# Patient Record
Sex: FEMALE | Race: WHITE | NOT HISPANIC OR LATINO | URBAN - METROPOLITAN AREA
[De-identification: names, ages, dates, MRNs, and addresses within clinical notes are randomized per-mention and may not be internally consistent; named-entity substitution may affect disease eponyms.]

---

## 2019-01-01 ENCOUNTER — INPATIENT (INPATIENT)
Facility: HOSPITAL | Age: 74
LOS: 3 days | Discharge: HOME | DRG: 841 | End: 2019-10-28
Attending: INTERNAL MEDICINE | Admitting: INTERNAL MEDICINE
Payer: MEDICARE

## 2019-01-01 ENCOUNTER — INPATIENT (INPATIENT)
Facility: HOSPITAL | Age: 74
LOS: 3 days | Discharge: HOME HEALTH | DRG: 811 | End: 2019-05-16
Attending: FAMILY MEDICINE | Admitting: FAMILY MEDICINE
Payer: MEDICAID

## 2019-01-01 VITALS
HEART RATE: 64 BPM | RESPIRATION RATE: 18 BRPM | OXYGEN SATURATION: 93 % | SYSTOLIC BLOOD PRESSURE: 126 MMHG | DIASTOLIC BLOOD PRESSURE: 68 MMHG | TEMPERATURE: 98 F

## 2019-01-01 VITALS — RESPIRATION RATE: 24 BRPM | OXYGEN SATURATION: 92 % | HEART RATE: 94 BPM | WEIGHT: 250 LBS | TEMPERATURE: 98 F

## 2019-01-01 VITALS
DIASTOLIC BLOOD PRESSURE: 83 MMHG | TEMPERATURE: 98 F | HEIGHT: 62.2 IN | WEIGHT: 259.93 LBS | RESPIRATION RATE: 17 BRPM | SYSTOLIC BLOOD PRESSURE: 103 MMHG | HEART RATE: 70 BPM | OXYGEN SATURATION: 96 %

## 2019-01-01 VITALS
DIASTOLIC BLOOD PRESSURE: 70 MMHG | TEMPERATURE: 98 F | OXYGEN SATURATION: 100 % | HEART RATE: 95 BPM | SYSTOLIC BLOOD PRESSURE: 115 MMHG | RESPIRATION RATE: 18 BRPM

## 2019-01-01 DIAGNOSIS — N39.0 URINARY TRACT INFECTION, SITE NOT SPECIFIED: ICD-10-CM

## 2019-01-01 DIAGNOSIS — D64.9 ANEMIA, UNSPECIFIED: ICD-10-CM

## 2019-01-01 DIAGNOSIS — E78.5 HYPERLIPIDEMIA, UNSPECIFIED: ICD-10-CM

## 2019-01-01 DIAGNOSIS — N17.9 ACUTE KIDNEY FAILURE, UNSPECIFIED: ICD-10-CM

## 2019-01-01 DIAGNOSIS — R07.9 CHEST PAIN, UNSPECIFIED: ICD-10-CM

## 2019-01-01 DIAGNOSIS — R79.89 OTHER SPECIFIED ABNORMAL FINDINGS OF BLOOD CHEMISTRY: ICD-10-CM

## 2019-01-01 DIAGNOSIS — I25.10 ATHEROSCLEROTIC HEART DISEASE OF NATIVE CORONARY ARTERY WITHOUT ANGINA PECTORIS: ICD-10-CM

## 2019-01-01 DIAGNOSIS — Z29.9 ENCOUNTER FOR PROPHYLACTIC MEASURES, UNSPECIFIED: ICD-10-CM

## 2019-01-01 DIAGNOSIS — E88.09 OTHER DISORDERS OF PLASMA-PROTEIN METABOLISM, NOT ELSEWHERE CLASSIFIED: ICD-10-CM

## 2019-01-01 DIAGNOSIS — I10 ESSENTIAL (PRIMARY) HYPERTENSION: ICD-10-CM

## 2019-01-01 LAB
% ALBUMIN: 54.8 % — SIGNIFICANT CHANGE UP
% ALBUMIN: 56.1 % — SIGNIFICANT CHANGE UP
% ALPHA 1: 4.7 % — SIGNIFICANT CHANGE UP
% ALPHA 1: 4.9 % — SIGNIFICANT CHANGE UP
% ALPHA 2: 9.1 % — SIGNIFICANT CHANGE UP
% ALPHA 2: 9.1 % — SIGNIFICANT CHANGE UP
% BETA: 24.2 % — SIGNIFICANT CHANGE UP
% BETA: 24.4 % — SIGNIFICANT CHANGE UP
% GAMMA: 5.7 % — SIGNIFICANT CHANGE UP
% GAMMA: 7 % — SIGNIFICANT CHANGE UP
% M SPIKE: 13.7 % — SIGNIFICANT CHANGE UP
% M SPIKE: 14 % — SIGNIFICANT CHANGE UP
-  AMIKACIN: SIGNIFICANT CHANGE UP
-  AMPICILLIN/SULBACTAM: SIGNIFICANT CHANGE UP
-  AMPICILLIN: SIGNIFICANT CHANGE UP
-  AZTREONAM: SIGNIFICANT CHANGE UP
-  CEFAZOLIN: SIGNIFICANT CHANGE UP
-  CEFEPIME: SIGNIFICANT CHANGE UP
-  CEFOXITIN: SIGNIFICANT CHANGE UP
-  CEFTRIAXONE: SIGNIFICANT CHANGE UP
-  CIPROFLOXACIN: SIGNIFICANT CHANGE UP
-  ERTAPENEM: SIGNIFICANT CHANGE UP
-  GENTAMICIN: SIGNIFICANT CHANGE UP
-  IMIPENEM: SIGNIFICANT CHANGE UP
-  LEVOFLOXACIN: SIGNIFICANT CHANGE UP
-  MEROPENEM: SIGNIFICANT CHANGE UP
-  NITROFURANTOIN: SIGNIFICANT CHANGE UP
-  PIPERACILLIN/TAZOBACTAM: SIGNIFICANT CHANGE UP
-  TIGECYCLINE: SIGNIFICANT CHANGE UP
-  TOBRAMYCIN: SIGNIFICANT CHANGE UP
-  TRIMETHOPRIM/SULFAMETHOXAZOLE: SIGNIFICANT CHANGE UP
24R-OH-CALCIDIOL SERPL-MCNC: 23.7 NG/ML — LOW (ref 30–80)
24R-OH-CALCIDIOL SERPL-MCNC: 23.7 NG/ML — LOW (ref 30–80)
24R-OH-CALCIDIOL SERPL-MCNC: 25.1 NG/ML — LOW (ref 30–80)
ABO RH CONFIRMATION: SIGNIFICANT CHANGE UP
ALBUMIN SERPL ELPH-MCNC: 2.9 G/DL — LOW (ref 3.5–5)
ALBUMIN SERPL ELPH-MCNC: 3.5 G/DL — LOW (ref 3.6–5.5)
ALBUMIN SERPL ELPH-MCNC: 3.6 G/DL — SIGNIFICANT CHANGE UP (ref 3.5–5)
ALBUMIN SERPL ELPH-MCNC: 3.6 G/DL — SIGNIFICANT CHANGE UP (ref 3.6–5.5)
ALBUMIN/GLOB SERPL ELPH: 1.2 RATIO — SIGNIFICANT CHANGE UP
ALBUMIN/GLOB SERPL ELPH: 1.3 RATIO — SIGNIFICANT CHANGE UP
ALP SERPL-CCNC: 62 U/L — SIGNIFICANT CHANGE UP (ref 40–120)
ALP SERPL-CCNC: 64 U/L — SIGNIFICANT CHANGE UP (ref 40–120)
ALPHA1 GLOB SERPL ELPH-MCNC: 0.3 G/DL — SIGNIFICANT CHANGE UP (ref 0.1–0.4)
ALPHA1 GLOB SERPL ELPH-MCNC: 0.3 G/DL — SIGNIFICANT CHANGE UP (ref 0.1–0.4)
ALPHA2 GLOB SERPL ELPH-MCNC: 0.6 G/DL — SIGNIFICANT CHANGE UP (ref 0.5–1)
ALPHA2 GLOB SERPL ELPH-MCNC: 0.6 G/DL — SIGNIFICANT CHANGE UP (ref 0.5–1)
ALT FLD-CCNC: 16 U/L DA — SIGNIFICANT CHANGE UP (ref 10–60)
ALT FLD-CCNC: 21 U/L DA — SIGNIFICANT CHANGE UP (ref 10–60)
ANION GAP SERPL CALC-SCNC: 5 MMOL/L — SIGNIFICANT CHANGE UP (ref 5–17)
ANION GAP SERPL CALC-SCNC: 6 MMOL/L — SIGNIFICANT CHANGE UP (ref 5–17)
ANION GAP SERPL CALC-SCNC: 7 MMOL/L — SIGNIFICANT CHANGE UP (ref 5–17)
APPEARANCE UR: ABNORMAL
APPEARANCE UR: CLEAR — SIGNIFICANT CHANGE UP
APTT BLD: 31.4 SEC — SIGNIFICANT CHANGE UP (ref 27.5–36.3)
AST SERPL-CCNC: 13 U/L — SIGNIFICANT CHANGE UP (ref 10–40)
AST SERPL-CCNC: 13 U/L — SIGNIFICANT CHANGE UP (ref 10–40)
B-GLOBULIN SERPL ELPH-MCNC: 1.5 G/DL — HIGH (ref 0.5–1)
B-GLOBULIN SERPL ELPH-MCNC: 1.6 G/DL — HIGH (ref 0.5–1)
BACTERIA # UR AUTO: ABNORMAL /HPF
BASE EXCESS BLDV CALC-SCNC: -2.5 MMOL/L — LOW (ref -2–2)
BASOPHILS # BLD AUTO: 0.02 K/UL — SIGNIFICANT CHANGE UP (ref 0–0.2)
BASOPHILS # BLD AUTO: 0.03 K/UL — SIGNIFICANT CHANGE UP (ref 0–0.2)
BASOPHILS # BLD AUTO: 0.04 K/UL — SIGNIFICANT CHANGE UP (ref 0–0.2)
BASOPHILS # BLD AUTO: 0.05 K/UL — SIGNIFICANT CHANGE UP (ref 0–0.2)
BASOPHILS NFR BLD AUTO: 0.2 % — SIGNIFICANT CHANGE UP (ref 0–2)
BASOPHILS NFR BLD AUTO: 0.4 % — SIGNIFICANT CHANGE UP (ref 0–2)
BASOPHILS NFR BLD AUTO: 0.4 % — SIGNIFICANT CHANGE UP (ref 0–2)
BASOPHILS NFR BLD AUTO: 0.5 % — SIGNIFICANT CHANGE UP (ref 0–2)
BASOPHILS NFR BLD AUTO: 0.6 % — SIGNIFICANT CHANGE UP (ref 0–2)
BASOPHILS NFR BLD AUTO: 0.8 % — SIGNIFICANT CHANGE UP (ref 0–2)
BASOPHILS NFR BLD AUTO: 0.8 % — SIGNIFICANT CHANGE UP (ref 0–2)
BILIRUB SERPL-MCNC: 0.3 MG/DL — SIGNIFICANT CHANGE UP (ref 0.2–1.2)
BILIRUB SERPL-MCNC: 0.4 MG/DL — SIGNIFICANT CHANGE UP (ref 0.2–1.2)
BILIRUB UR-MCNC: NEGATIVE — SIGNIFICANT CHANGE UP
BILIRUB UR-MCNC: NEGATIVE — SIGNIFICANT CHANGE UP
BLD GP AB SCN SERPL QL: SIGNIFICANT CHANGE UP
BUN SERPL-MCNC: 13 MG/DL — SIGNIFICANT CHANGE UP (ref 7–18)
BUN SERPL-MCNC: 16 MG/DL — SIGNIFICANT CHANGE UP (ref 7–18)
BUN SERPL-MCNC: 23 MG/DL — HIGH (ref 7–18)
BUN SERPL-MCNC: 25 MG/DL — HIGH (ref 7–18)
BUN SERPL-MCNC: 27 MG/DL — HIGH (ref 7–18)
BUN SERPL-MCNC: 28 MG/DL — HIGH (ref 7–18)
BUN SERPL-MCNC: 28 MG/DL — HIGH (ref 7–18)
BUN SERPL-MCNC: 33 MG/DL — HIGH (ref 7–18)
BUN SERPL-MCNC: 35 MG/DL — HIGH (ref 7–18)
CALCIUM SERPL-MCNC: 7.8 MG/DL — LOW (ref 8.4–10.5)
CALCIUM SERPL-MCNC: 8 MG/DL — LOW (ref 8.4–10.5)
CALCIUM SERPL-MCNC: 8.4 MG/DL — SIGNIFICANT CHANGE UP (ref 8.4–10.5)
CALCIUM SERPL-MCNC: 8.6 MG/DL — SIGNIFICANT CHANGE UP (ref 8.4–10.5)
CALCIUM SERPL-MCNC: 8.8 MG/DL — SIGNIFICANT CHANGE UP (ref 8.4–10.5)
CALCIUM SERPL-MCNC: 9 MG/DL — SIGNIFICANT CHANGE UP (ref 8.4–10.5)
CALCIUM SERPL-MCNC: 9.1 MG/DL — SIGNIFICANT CHANGE UP (ref 8.4–10.5)
CALCIUM SERPL-MCNC: 9.1 MG/DL — SIGNIFICANT CHANGE UP (ref 8.4–10.5)
CALCIUM SERPL-MCNC: 9.3 MG/DL — SIGNIFICANT CHANGE UP (ref 8.4–10.5)
CANCER AG19-9 SERPL-ACNC: 9 U/ML — SIGNIFICANT CHANGE UP
CEA SERPL-MCNC: 2.4 NG/ML — SIGNIFICANT CHANGE UP (ref 0–3.8)
CEA SERPL-MCNC: 2.6 NG/ML — SIGNIFICANT CHANGE UP (ref 0–3.8)
CHLORIDE SERPL-SCNC: 110 MMOL/L — HIGH (ref 96–108)
CHLORIDE SERPL-SCNC: 111 MMOL/L — HIGH (ref 96–108)
CHLORIDE SERPL-SCNC: 111 MMOL/L — HIGH (ref 96–108)
CHLORIDE SERPL-SCNC: 112 MMOL/L — HIGH (ref 96–108)
CHLORIDE SERPL-SCNC: 112 MMOL/L — HIGH (ref 96–108)
CHLORIDE SERPL-SCNC: 113 MMOL/L — HIGH (ref 96–108)
CHLORIDE SERPL-SCNC: 113 MMOL/L — HIGH (ref 96–108)
CHLORIDE SERPL-SCNC: 114 MMOL/L — HIGH (ref 96–108)
CHLORIDE SERPL-SCNC: 115 MMOL/L — HIGH (ref 96–108)
CHLORIDE UR-SCNC: 113 MMOL/L — SIGNIFICANT CHANGE UP (ref 55–125)
CHOLEST SERPL-MCNC: 103 MG/DL — SIGNIFICANT CHANGE UP (ref 10–199)
CHOLEST SERPL-MCNC: 180 MG/DL — SIGNIFICANT CHANGE UP (ref 10–199)
CO2 SERPL-SCNC: 23 MMOL/L — SIGNIFICANT CHANGE UP (ref 22–31)
CO2 SERPL-SCNC: 24 MMOL/L — SIGNIFICANT CHANGE UP (ref 22–31)
CO2 SERPL-SCNC: 25 MMOL/L — SIGNIFICANT CHANGE UP (ref 22–31)
CO2 SERPL-SCNC: 25 MMOL/L — SIGNIFICANT CHANGE UP (ref 22–31)
CO2 SERPL-SCNC: 26 MMOL/L — SIGNIFICANT CHANGE UP (ref 22–31)
COLOR SPEC: YELLOW — SIGNIFICANT CHANGE UP
COLOR SPEC: YELLOW — SIGNIFICANT CHANGE UP
COMMENT - URINE: SIGNIFICANT CHANGE UP
CREAT ?TM UR-MCNC: 47 MG/DL — SIGNIFICANT CHANGE UP
CREAT SERPL-MCNC: 0.88 MG/DL — SIGNIFICANT CHANGE UP (ref 0.5–1.3)
CREAT SERPL-MCNC: 1.03 MG/DL — SIGNIFICANT CHANGE UP (ref 0.5–1.3)
CREAT SERPL-MCNC: 1.04 MG/DL — SIGNIFICANT CHANGE UP (ref 0.5–1.3)
CREAT SERPL-MCNC: 1.07 MG/DL — SIGNIFICANT CHANGE UP (ref 0.5–1.3)
CREAT SERPL-MCNC: 1.07 MG/DL — SIGNIFICANT CHANGE UP (ref 0.5–1.3)
CREAT SERPL-MCNC: 1.13 MG/DL — SIGNIFICANT CHANGE UP (ref 0.5–1.3)
CREAT SERPL-MCNC: 1.15 MG/DL — SIGNIFICANT CHANGE UP (ref 0.5–1.3)
CREAT SERPL-MCNC: 1.25 MG/DL — SIGNIFICANT CHANGE UP (ref 0.5–1.3)
CREAT SERPL-MCNC: 1.61 MG/DL — HIGH (ref 0.5–1.3)
CULTURE RESULTS: SIGNIFICANT CHANGE UP
CULTURE RESULTS: SIGNIFICANT CHANGE UP
DIFF PNL FLD: ABNORMAL
DIFF PNL FLD: ABNORMAL
DIR ANTIGLOB POLYSPECIFIC INTERPRETATION: SIGNIFICANT CHANGE UP
EOSINOPHIL # BLD AUTO: 0.01 K/UL — SIGNIFICANT CHANGE UP (ref 0–0.5)
EOSINOPHIL # BLD AUTO: 0.02 K/UL — SIGNIFICANT CHANGE UP (ref 0–0.5)
EOSINOPHIL # BLD AUTO: 0.03 K/UL — SIGNIFICANT CHANGE UP (ref 0–0.5)
EOSINOPHIL # BLD AUTO: 0.04 K/UL — SIGNIFICANT CHANGE UP (ref 0–0.5)
EOSINOPHIL # BLD AUTO: 0.04 K/UL — SIGNIFICANT CHANGE UP (ref 0–0.5)
EOSINOPHIL # BLD AUTO: 0.05 K/UL — SIGNIFICANT CHANGE UP (ref 0–0.5)
EOSINOPHIL # BLD AUTO: 0.07 K/UL — SIGNIFICANT CHANGE UP (ref 0–0.5)
EOSINOPHIL NFR BLD AUTO: 0.1 % — SIGNIFICANT CHANGE UP (ref 0–6)
EOSINOPHIL NFR BLD AUTO: 0.2 % — SIGNIFICANT CHANGE UP (ref 0–6)
EOSINOPHIL NFR BLD AUTO: 0.3 % — SIGNIFICANT CHANGE UP (ref 0–6)
EOSINOPHIL NFR BLD AUTO: 0.3 % — SIGNIFICANT CHANGE UP (ref 0–6)
EOSINOPHIL NFR BLD AUTO: 0.4 % — SIGNIFICANT CHANGE UP (ref 0–6)
EOSINOPHIL NFR BLD AUTO: 0.5 % — SIGNIFICANT CHANGE UP (ref 0–6)
EOSINOPHIL NFR BLD AUTO: 0.6 % — SIGNIFICANT CHANGE UP (ref 0–6)
EOSINOPHIL NFR BLD AUTO: 0.6 % — SIGNIFICANT CHANGE UP (ref 0–6)
EOSINOPHIL NFR BLD AUTO: 0.8 % — SIGNIFICANT CHANGE UP (ref 0–6)
EOSINOPHIL NFR BLD AUTO: 1.4 % — SIGNIFICANT CHANGE UP (ref 0–6)
EPI CELLS # UR: SIGNIFICANT CHANGE UP /HPF
ERYTHROCYTE [SEDIMENTATION RATE] IN BLOOD: 30 MM/HR — HIGH (ref 0–20)
FERRITIN SERPL-MCNC: 119 NG/ML — SIGNIFICANT CHANGE UP (ref 15–150)
FERRITIN SERPL-MCNC: 153 NG/ML — HIGH (ref 15–150)
FERRITIN SERPL-MCNC: 172 NG/ML — HIGH (ref 15–150)
FOLATE SERPL-MCNC: >20 NG/ML — SIGNIFICANT CHANGE UP
GAMMA GLOBULIN: 0.4 G/DL — LOW (ref 0.6–1.6)
GAMMA GLOBULIN: 0.4 G/DL — LOW (ref 0.6–1.6)
GLUCOSE SERPL-MCNC: 100 MG/DL — HIGH (ref 70–99)
GLUCOSE SERPL-MCNC: 101 MG/DL — HIGH (ref 70–99)
GLUCOSE SERPL-MCNC: 103 MG/DL — HIGH (ref 70–99)
GLUCOSE SERPL-MCNC: 105 MG/DL — HIGH (ref 70–99)
GLUCOSE SERPL-MCNC: 142 MG/DL — HIGH (ref 70–99)
GLUCOSE SERPL-MCNC: 94 MG/DL — SIGNIFICANT CHANGE UP (ref 70–99)
GLUCOSE SERPL-MCNC: 94 MG/DL — SIGNIFICANT CHANGE UP (ref 70–99)
GLUCOSE SERPL-MCNC: 97 MG/DL — SIGNIFICANT CHANGE UP (ref 70–99)
GLUCOSE SERPL-MCNC: 97 MG/DL — SIGNIFICANT CHANGE UP (ref 70–99)
GLUCOSE UR QL: NEGATIVE — SIGNIFICANT CHANGE UP
GLUCOSE UR QL: NEGATIVE — SIGNIFICANT CHANGE UP
HAPTOGLOB SERPL-MCNC: 56 MG/DL — SIGNIFICANT CHANGE UP (ref 34–200)
HBA1C BLD-MCNC: 5.9 % — HIGH (ref 4–5.6)
HBA1C BLD-MCNC: 6.2 % — HIGH (ref 4–5.6)
HBV SURFACE AG SER-ACNC: SIGNIFICANT CHANGE UP
HCO3 BLDV-SCNC: 24 MMOL/L — SIGNIFICANT CHANGE UP (ref 21–29)
HCT VFR BLD CALC: 24.4 % — LOW (ref 34.5–45)
HCT VFR BLD CALC: 25 % — LOW (ref 34.5–45)
HCT VFR BLD CALC: 26.8 % — LOW (ref 34.5–45)
HCT VFR BLD CALC: 27.4 % — LOW (ref 34.5–45)
HCT VFR BLD CALC: 27.5 % — LOW (ref 34.5–45)
HCT VFR BLD CALC: 27.5 % — LOW (ref 34.5–45)
HCT VFR BLD CALC: 28 % — LOW (ref 34.5–45)
HCT VFR BLD CALC: 28.3 % — LOW (ref 34.5–45)
HCT VFR BLD CALC: 29 % — LOW (ref 34.5–45)
HCT VFR BLD CALC: 29.2 % — LOW (ref 34.5–45)
HCT VFR BLD CALC: 29.4 % — LOW (ref 34.5–45)
HCV AB S/CO SERPL IA: 0.1 S/CO — SIGNIFICANT CHANGE UP (ref 0–0.99)
HCV AB SERPL-IMP: SIGNIFICANT CHANGE UP
HDLC SERPL-MCNC: 34 MG/DL — LOW
HDLC SERPL-MCNC: 37 MG/DL — LOW
HGB BLD-MCNC: 7.5 G/DL — LOW (ref 11.5–15.5)
HGB BLD-MCNC: 7.8 G/DL — LOW (ref 11.5–15.5)
HGB BLD-MCNC: 8.1 G/DL — LOW (ref 11.5–15.5)
HGB BLD-MCNC: 8.4 G/DL — LOW (ref 11.5–15.5)
HGB BLD-MCNC: 8.5 G/DL — LOW (ref 11.5–15.5)
HGB BLD-MCNC: 8.5 G/DL — LOW (ref 11.5–15.5)
HGB BLD-MCNC: 8.6 G/DL — LOW (ref 11.5–15.5)
HGB BLD-MCNC: 8.7 G/DL — LOW (ref 11.5–15.5)
HGB BLD-MCNC: 8.9 G/DL — LOW (ref 11.5–15.5)
HGB BLD-MCNC: 8.9 G/DL — LOW (ref 11.5–15.5)
HGB BLD-MCNC: 9 G/DL — LOW (ref 11.5–15.5)
HOROWITZ INDEX BLDV+IHG-RTO: 21 — SIGNIFICANT CHANGE UP
IMM GRANULOCYTES NFR BLD AUTO: 0.1 % — SIGNIFICANT CHANGE UP (ref 0–1.5)
IMM GRANULOCYTES NFR BLD AUTO: 0.2 % — SIGNIFICANT CHANGE UP (ref 0–1.5)
IMM GRANULOCYTES NFR BLD AUTO: 0.3 % — SIGNIFICANT CHANGE UP (ref 0–1.5)
IMM GRANULOCYTES NFR BLD AUTO: 0.5 % — SIGNIFICANT CHANGE UP (ref 0–1.5)
IMM GRANULOCYTES NFR BLD AUTO: 0.6 % — SIGNIFICANT CHANGE UP (ref 0–1.5)
IMM GRANULOCYTES NFR BLD AUTO: 0.8 % — SIGNIFICANT CHANGE UP (ref 0–1.5)
IMM GRANULOCYTES NFR BLD AUTO: 1 % — SIGNIFICANT CHANGE UP (ref 0–1.5)
IMM GRANULOCYTES NFR BLD AUTO: 1.3 % — SIGNIFICANT CHANGE UP (ref 0–1.5)
INR BLD: 1.03 RATIO — SIGNIFICANT CHANGE UP (ref 0.88–1.16)
INR BLD: 1.07 RATIO — SIGNIFICANT CHANGE UP (ref 0.88–1.16)
INTERPRETATION SERPL IFE-IMP: SIGNIFICANT CHANGE UP
IRON SATN MFR SERPL: 14 % — LOW (ref 15–50)
IRON SATN MFR SERPL: 16 UG/DL — LOW (ref 40–150)
IRON SATN MFR SERPL: 29 UG/DL — LOW (ref 40–150)
IRON SATN MFR SERPL: 8 % — LOW (ref 15–50)
KAPPA LC SER QL IFE: 8.76 MG/DL — HIGH (ref 0.33–1.94)
KAPPA/LAMBDA FREE LIGHT CHAIN RATIO, SERUM: 6.59 RATIO — HIGH (ref 0.26–1.65)
KETONES UR-MCNC: NEGATIVE — SIGNIFICANT CHANGE UP
KETONES UR-MCNC: NEGATIVE — SIGNIFICANT CHANGE UP
LAMBDA LC SER QL IFE: 1.33 MG/DL — SIGNIFICANT CHANGE UP (ref 0.57–2.63)
LDH SERPL L TO P-CCNC: 264 U/L — HIGH (ref 120–225)
LEUKOCYTE ESTERASE UR-ACNC: ABNORMAL
LEUKOCYTE ESTERASE UR-ACNC: ABNORMAL
LIDOCAIN IGE QN: 57 U/L — LOW (ref 73–393)
LIPID PNL WITH DIRECT LDL SERPL: 38 MG/DL — SIGNIFICANT CHANGE UP
LIPID PNL WITH DIRECT LDL SERPL: 92 MG/DL — SIGNIFICANT CHANGE UP
LYMPHOCYTES # BLD AUTO: 1.71 K/UL — SIGNIFICANT CHANGE UP (ref 1–3.3)
LYMPHOCYTES # BLD AUTO: 1.8 K/UL — SIGNIFICANT CHANGE UP (ref 1–3.3)
LYMPHOCYTES # BLD AUTO: 1.81 K/UL — SIGNIFICANT CHANGE UP (ref 1–3.3)
LYMPHOCYTES # BLD AUTO: 19.5 % — SIGNIFICANT CHANGE UP (ref 13–44)
LYMPHOCYTES # BLD AUTO: 2.07 K/UL — SIGNIFICANT CHANGE UP (ref 1–3.3)
LYMPHOCYTES # BLD AUTO: 2.13 K/UL — SIGNIFICANT CHANGE UP (ref 1–3.3)
LYMPHOCYTES # BLD AUTO: 26.4 % — SIGNIFICANT CHANGE UP (ref 13–44)
LYMPHOCYTES # BLD AUTO: 3.03 K/UL — SIGNIFICANT CHANGE UP (ref 1–3.3)
LYMPHOCYTES # BLD AUTO: 3.13 K/UL — SIGNIFICANT CHANGE UP (ref 1–3.3)
LYMPHOCYTES # BLD AUTO: 3.44 K/UL — HIGH (ref 1–3.3)
LYMPHOCYTES # BLD AUTO: 3.5 K/UL — HIGH (ref 1–3.3)
LYMPHOCYTES # BLD AUTO: 3.79 K/UL — HIGH (ref 1–3.3)
LYMPHOCYTES # BLD AUTO: 32.1 % — SIGNIFICANT CHANGE UP (ref 13–44)
LYMPHOCYTES # BLD AUTO: 35.3 % — SIGNIFICANT CHANGE UP (ref 13–44)
LYMPHOCYTES # BLD AUTO: 38.2 % — SIGNIFICANT CHANGE UP (ref 13–44)
LYMPHOCYTES # BLD AUTO: 41.5 % — SIGNIFICANT CHANGE UP (ref 13–44)
LYMPHOCYTES # BLD AUTO: 46.2 % — HIGH (ref 13–44)
LYMPHOCYTES # BLD AUTO: 52.1 % — HIGH (ref 13–44)
LYMPHOCYTES # BLD AUTO: 53.3 % — HIGH (ref 13–44)
LYMPHOCYTES # BLD AUTO: 59.1 % — HIGH (ref 13–44)
M-SPIKE: 0.9 G/DL — HIGH (ref 0–0)
M-SPIKE: 0.9 G/DL — HIGH (ref 0–0)
MAGNESIUM SERPL-MCNC: 1.8 MG/DL — SIGNIFICANT CHANGE UP (ref 1.6–2.6)
MAGNESIUM SERPL-MCNC: 2 MG/DL — SIGNIFICANT CHANGE UP (ref 1.6–2.6)
MAGNESIUM SERPL-MCNC: 2 MG/DL — SIGNIFICANT CHANGE UP (ref 1.6–2.6)
MAGNESIUM SERPL-MCNC: 2.1 MG/DL — SIGNIFICANT CHANGE UP (ref 1.6–2.6)
MAGNESIUM SERPL-MCNC: 2.2 MG/DL — SIGNIFICANT CHANGE UP (ref 1.6–2.6)
MCHC RBC-ENTMCNC: 28.9 PG — SIGNIFICANT CHANGE UP (ref 27–34)
MCHC RBC-ENTMCNC: 29 PG — SIGNIFICANT CHANGE UP (ref 27–34)
MCHC RBC-ENTMCNC: 29.3 PG — SIGNIFICANT CHANGE UP (ref 27–34)
MCHC RBC-ENTMCNC: 30.2 GM/DL — LOW (ref 32–36)
MCHC RBC-ENTMCNC: 30.3 GM/DL — LOW (ref 32–36)
MCHC RBC-ENTMCNC: 30.5 GM/DL — LOW (ref 32–36)
MCHC RBC-ENTMCNC: 30.5 PG — SIGNIFICANT CHANGE UP (ref 27–34)
MCHC RBC-ENTMCNC: 30.7 GM/DL — LOW (ref 32–36)
MCHC RBC-ENTMCNC: 30.7 PG — SIGNIFICANT CHANGE UP (ref 27–34)
MCHC RBC-ENTMCNC: 30.8 PG — SIGNIFICANT CHANGE UP (ref 27–34)
MCHC RBC-ENTMCNC: 30.9 GM/DL — LOW (ref 32–36)
MCHC RBC-ENTMCNC: 30.9 GM/DL — LOW (ref 32–36)
MCHC RBC-ENTMCNC: 31 GM/DL — LOW (ref 32–36)
MCHC RBC-ENTMCNC: 31 PG — SIGNIFICANT CHANGE UP (ref 27–34)
MCHC RBC-ENTMCNC: 31.2 GM/DL — LOW (ref 32–36)
MCV RBC AUTO: 100 FL — SIGNIFICANT CHANGE UP (ref 80–100)
MCV RBC AUTO: 100 FL — SIGNIFICANT CHANGE UP (ref 80–100)
MCV RBC AUTO: 100.4 FL — HIGH (ref 80–100)
MCV RBC AUTO: 101.7 FL — HIGH (ref 80–100)
MCV RBC AUTO: 101.9 FL — HIGH (ref 80–100)
MCV RBC AUTO: 93.9 FL — SIGNIFICANT CHANGE UP (ref 80–100)
MCV RBC AUTO: 93.9 FL — SIGNIFICANT CHANGE UP (ref 80–100)
MCV RBC AUTO: 94 FL — SIGNIFICANT CHANGE UP (ref 80–100)
MCV RBC AUTO: 94.2 FL — SIGNIFICANT CHANGE UP (ref 80–100)
MCV RBC AUTO: 94.8 FL — SIGNIFICANT CHANGE UP (ref 80–100)
MCV RBC AUTO: 99.3 FL — SIGNIFICANT CHANGE UP (ref 80–100)
METHOD TYPE: SIGNIFICANT CHANGE UP
MONOCYTES # BLD AUTO: 0.53 K/UL — SIGNIFICANT CHANGE UP (ref 0–0.9)
MONOCYTES # BLD AUTO: 0.55 K/UL — SIGNIFICANT CHANGE UP (ref 0–0.9)
MONOCYTES # BLD AUTO: 0.6 K/UL — SIGNIFICANT CHANGE UP (ref 0–0.9)
MONOCYTES # BLD AUTO: 0.76 K/UL — SIGNIFICANT CHANGE UP (ref 0–0.9)
MONOCYTES # BLD AUTO: 0.82 K/UL — SIGNIFICANT CHANGE UP (ref 0–0.9)
MONOCYTES # BLD AUTO: 0.82 K/UL — SIGNIFICANT CHANGE UP (ref 0–0.9)
MONOCYTES # BLD AUTO: 0.85 K/UL — SIGNIFICANT CHANGE UP (ref 0–0.9)
MONOCYTES # BLD AUTO: 0.91 K/UL — HIGH (ref 0–0.9)
MONOCYTES # BLD AUTO: 0.93 K/UL — HIGH (ref 0–0.9)
MONOCYTES # BLD AUTO: 1.28 K/UL — HIGH (ref 0–0.9)
MONOCYTES NFR BLD AUTO: 10.4 % — SIGNIFICANT CHANGE UP (ref 2–14)
MONOCYTES NFR BLD AUTO: 10.7 % — SIGNIFICANT CHANGE UP (ref 2–14)
MONOCYTES NFR BLD AUTO: 10.8 % — SIGNIFICANT CHANGE UP (ref 2–14)
MONOCYTES NFR BLD AUTO: 11.3 % — SIGNIFICANT CHANGE UP (ref 2–14)
MONOCYTES NFR BLD AUTO: 11.3 % — SIGNIFICANT CHANGE UP (ref 2–14)
MONOCYTES NFR BLD AUTO: 11.7 % — SIGNIFICANT CHANGE UP (ref 2–14)
MONOCYTES NFR BLD AUTO: 12.2 % — SIGNIFICANT CHANGE UP (ref 2–14)
MONOCYTES NFR BLD AUTO: 12.8 % — SIGNIFICANT CHANGE UP (ref 2–14)
MONOCYTES NFR BLD AUTO: 13.7 % — SIGNIFICANT CHANGE UP (ref 2–14)
MONOCYTES NFR BLD AUTO: 14.1 % — HIGH (ref 2–14)
NEUTROPHILS # BLD AUTO: 1.7 K/UL — LOW (ref 1.8–7.4)
NEUTROPHILS # BLD AUTO: 2 K/UL — SIGNIFICANT CHANGE UP (ref 1.8–7.4)
NEUTROPHILS # BLD AUTO: 2.29 K/UL — SIGNIFICANT CHANGE UP (ref 1.8–7.4)
NEUTROPHILS # BLD AUTO: 2.34 K/UL — SIGNIFICANT CHANGE UP (ref 1.8–7.4)
NEUTROPHILS # BLD AUTO: 2.61 K/UL — SIGNIFICANT CHANGE UP (ref 1.8–7.4)
NEUTROPHILS # BLD AUTO: 2.65 K/UL — SIGNIFICANT CHANGE UP (ref 1.8–7.4)
NEUTROPHILS # BLD AUTO: 2.88 K/UL — SIGNIFICANT CHANGE UP (ref 1.8–7.4)
NEUTROPHILS # BLD AUTO: 3.43 K/UL — SIGNIFICANT CHANGE UP (ref 1.8–7.4)
NEUTROPHILS # BLD AUTO: 4.8 K/UL — SIGNIFICANT CHANGE UP (ref 1.8–7.4)
NEUTROPHILS # BLD AUTO: 7.42 K/UL — HIGH (ref 1.8–7.4)
NEUTROPHILS NFR BLD AUTO: 26.5 % — LOW (ref 43–77)
NEUTROPHILS NFR BLD AUTO: 30.9 % — LOW (ref 43–77)
NEUTROPHILS NFR BLD AUTO: 34.9 % — LOW (ref 43–77)
NEUTROPHILS NFR BLD AUTO: 39.1 % — LOW (ref 43–77)
NEUTROPHILS NFR BLD AUTO: 47 % — SIGNIFICANT CHANGE UP (ref 43–77)
NEUTROPHILS NFR BLD AUTO: 48.7 % — SIGNIFICANT CHANGE UP (ref 43–77)
NEUTROPHILS NFR BLD AUTO: 50.8 % — SIGNIFICANT CHANGE UP (ref 43–77)
NEUTROPHILS NFR BLD AUTO: 54.1 % — SIGNIFICANT CHANGE UP (ref 43–77)
NEUTROPHILS NFR BLD AUTO: 61.3 % — SIGNIFICANT CHANGE UP (ref 43–77)
NEUTROPHILS NFR BLD AUTO: 67.9 % — SIGNIFICANT CHANGE UP (ref 43–77)
NITRITE UR-MCNC: POSITIVE
NITRITE UR-MCNC: POSITIVE
NRBC # BLD: 0 /100 WBCS — SIGNIFICANT CHANGE UP (ref 0–0)
OB PNL STL: NEGATIVE — SIGNIFICANT CHANGE UP
OB PNL STL: NEGATIVE — SIGNIFICANT CHANGE UP
OB PNL STL: POSITIVE
ORGANISM # SPEC MICROSCOPIC CNT: SIGNIFICANT CHANGE UP
ORGANISM # SPEC MICROSCOPIC CNT: SIGNIFICANT CHANGE UP
PCO2 BLDV: 52 MMHG — HIGH (ref 35–50)
PH BLDV: 7.28 — LOW (ref 7.35–7.45)
PH UR: 5 — SIGNIFICANT CHANGE UP (ref 5–8)
PH UR: 5 — SIGNIFICANT CHANGE UP (ref 5–8)
PHOSPHATE SERPL-MCNC: 2.6 MG/DL — SIGNIFICANT CHANGE UP (ref 2.5–4.5)
PHOSPHATE SERPL-MCNC: 3.2 MG/DL — SIGNIFICANT CHANGE UP (ref 2.5–4.5)
PHOSPHATE SERPL-MCNC: 3.3 MG/DL — SIGNIFICANT CHANGE UP (ref 2.5–4.5)
PHOSPHATE SERPL-MCNC: 3.7 MG/DL — SIGNIFICANT CHANGE UP (ref 2.5–4.5)
PHOSPHATE SERPL-MCNC: 3.8 MG/DL — SIGNIFICANT CHANGE UP (ref 2.5–4.5)
PLATELET # BLD AUTO: 211 K/UL — SIGNIFICANT CHANGE UP (ref 150–400)
PLATELET # BLD AUTO: 225 K/UL — SIGNIFICANT CHANGE UP (ref 150–400)
PLATELET # BLD AUTO: 225 K/UL — SIGNIFICANT CHANGE UP (ref 150–400)
PLATELET # BLD AUTO: 228 K/UL — SIGNIFICANT CHANGE UP (ref 150–400)
PLATELET # BLD AUTO: 228 K/UL — SIGNIFICANT CHANGE UP (ref 150–400)
PLATELET # BLD AUTO: 237 K/UL — SIGNIFICANT CHANGE UP (ref 150–400)
PLATELET # BLD AUTO: 247 K/UL — SIGNIFICANT CHANGE UP (ref 150–400)
PLATELET # BLD AUTO: 278 K/UL — SIGNIFICANT CHANGE UP (ref 150–400)
PLATELET # BLD AUTO: 307 K/UL — SIGNIFICANT CHANGE UP (ref 150–400)
PLATELET # BLD AUTO: 335 K/UL — SIGNIFICANT CHANGE UP (ref 150–400)
PLATELET # BLD AUTO: 370 K/UL — SIGNIFICANT CHANGE UP (ref 150–400)
PO2 BLDV: 14 MMHG — SIGNIFICANT CHANGE UP (ref 25–45)
POTASSIUM SERPL-MCNC: 4.1 MMOL/L — SIGNIFICANT CHANGE UP (ref 3.5–5.3)
POTASSIUM SERPL-MCNC: 4.1 MMOL/L — SIGNIFICANT CHANGE UP (ref 3.5–5.3)
POTASSIUM SERPL-MCNC: 4.2 MMOL/L — SIGNIFICANT CHANGE UP (ref 3.5–5.3)
POTASSIUM SERPL-MCNC: 4.2 MMOL/L — SIGNIFICANT CHANGE UP (ref 3.5–5.3)
POTASSIUM SERPL-MCNC: 4.3 MMOL/L — SIGNIFICANT CHANGE UP (ref 3.5–5.3)
POTASSIUM SERPL-MCNC: 4.4 MMOL/L — SIGNIFICANT CHANGE UP (ref 3.5–5.3)
POTASSIUM SERPL-MCNC: 4.5 MMOL/L — SIGNIFICANT CHANGE UP (ref 3.5–5.3)
POTASSIUM SERPL-SCNC: 4.1 MMOL/L — SIGNIFICANT CHANGE UP (ref 3.5–5.3)
POTASSIUM SERPL-SCNC: 4.1 MMOL/L — SIGNIFICANT CHANGE UP (ref 3.5–5.3)
POTASSIUM SERPL-SCNC: 4.2 MMOL/L — SIGNIFICANT CHANGE UP (ref 3.5–5.3)
POTASSIUM SERPL-SCNC: 4.2 MMOL/L — SIGNIFICANT CHANGE UP (ref 3.5–5.3)
POTASSIUM SERPL-SCNC: 4.3 MMOL/L — SIGNIFICANT CHANGE UP (ref 3.5–5.3)
POTASSIUM SERPL-SCNC: 4.4 MMOL/L — SIGNIFICANT CHANGE UP (ref 3.5–5.3)
POTASSIUM SERPL-SCNC: 4.5 MMOL/L — SIGNIFICANT CHANGE UP (ref 3.5–5.3)
PROT ?TM UR-MCNC: 34 MG/DL — HIGH (ref 0–12)
PROT PATTERN SERPL ELPH-IMP: SIGNIFICANT CHANGE UP
PROT PATTERN SERPL ELPH-IMP: SIGNIFICANT CHANGE UP
PROT SERPL-MCNC: 6.4 G/DL — SIGNIFICANT CHANGE UP (ref 6–8.3)
PROT SERPL-MCNC: 6.8 G/DL — SIGNIFICANT CHANGE UP (ref 6–8.3)
PROT SERPL-MCNC: 7.3 G/DL — SIGNIFICANT CHANGE UP (ref 6–8.3)
PROT UR-MCNC: 30 MG/DL
PROT UR-MCNC: 30 MG/DL
PROTHROM AB SERPL-ACNC: 11.5 SEC — SIGNIFICANT CHANGE UP (ref 10–12.9)
PROTHROM AB SERPL-ACNC: 11.9 SEC — SIGNIFICANT CHANGE UP (ref 10–12.9)
RBC # BLD: 2.59 M/UL — LOW (ref 3.8–5.2)
RBC # BLD: 2.63 M/UL — LOW (ref 3.8–5.2)
RBC # BLD: 2.66 M/UL — LOW (ref 3.8–5.2)
RBC # BLD: 2.73 M/UL — LOW (ref 3.8–5.2)
RBC # BLD: 2.8 M/UL — LOW (ref 3.8–5.2)
RBC # BLD: 2.83 M/UL — LOW (ref 3.8–5.2)
RBC # BLD: 2.85 M/UL — LOW (ref 3.8–5.2)
RBC # BLD: 2.89 M/UL — LOW (ref 3.8–5.2)
RBC # BLD: 2.89 M/UL — LOW (ref 3.8–5.2)
RBC # BLD: 2.9 M/UL — LOW (ref 3.8–5.2)
RBC # BLD: 2.93 M/UL — LOW (ref 3.8–5.2)
RBC # BLD: 2.93 M/UL — LOW (ref 3.8–5.2)
RBC # BLD: 3.08 M/UL — LOW (ref 3.8–5.2)
RBC # FLD: 15.3 % — HIGH (ref 10.3–14.5)
RBC # FLD: 15.5 % — HIGH (ref 10.3–14.5)
RBC # FLD: 15.7 % — HIGH (ref 10.3–14.5)
RBC # FLD: 17.4 % — HIGH (ref 10.3–14.5)
RBC # FLD: 17.9 % — HIGH (ref 10.3–14.5)
RBC # FLD: 18.1 % — HIGH (ref 10.3–14.5)
RBC # FLD: 18.2 % — HIGH (ref 10.3–14.5)
RBC # FLD: 18.4 % — HIGH (ref 10.3–14.5)
RBC # FLD: 18.5 % — HIGH (ref 10.3–14.5)
RBC CASTS # UR COMP ASSIST: ABNORMAL /HPF (ref 0–2)
RETICS #: 35.7 K/UL — SIGNIFICANT CHANGE UP (ref 25–125)
RETICS #: 55.5 K/UL — SIGNIFICANT CHANGE UP (ref 25–125)
RETICS/RBC NFR: 1.3 % — SIGNIFICANT CHANGE UP (ref 0.5–2.5)
RETICS/RBC NFR: 1.9 % — SIGNIFICANT CHANGE UP (ref 0.5–2.5)
SAO2 % BLDV: 10 % — LOW (ref 67–88)
SODIUM SERPL-SCNC: 141 MMOL/L — SIGNIFICANT CHANGE UP (ref 135–145)
SODIUM SERPL-SCNC: 141 MMOL/L — SIGNIFICANT CHANGE UP (ref 135–145)
SODIUM SERPL-SCNC: 142 MMOL/L — SIGNIFICANT CHANGE UP (ref 135–145)
SODIUM SERPL-SCNC: 142 MMOL/L — SIGNIFICANT CHANGE UP (ref 135–145)
SODIUM SERPL-SCNC: 143 MMOL/L — SIGNIFICANT CHANGE UP (ref 135–145)
SODIUM SERPL-SCNC: 143 MMOL/L — SIGNIFICANT CHANGE UP (ref 135–145)
SODIUM SERPL-SCNC: 144 MMOL/L — SIGNIFICANT CHANGE UP (ref 135–145)
SODIUM UR-SCNC: 105 MMOL/L — SIGNIFICANT CHANGE UP (ref 40–220)
SP GR SPEC: 1.01 — SIGNIFICANT CHANGE UP (ref 1.01–1.02)
SP GR SPEC: 1.01 — SIGNIFICANT CHANGE UP (ref 1.01–1.02)
SPECIMEN SOURCE: SIGNIFICANT CHANGE UP
SPECIMEN SOURCE: SIGNIFICANT CHANGE UP
SURGICAL PATHOLOGY STUDY: SIGNIFICANT CHANGE UP
TIBC SERPL-MCNC: 197 UG/DL — LOW (ref 250–450)
TIBC SERPL-MCNC: 207 UG/DL — LOW (ref 250–450)
TOTAL CHOLESTEROL/HDL RATIO MEASUREMENT: 3 RATIO — LOW (ref 3.3–7.1)
TOTAL CHOLESTEROL/HDL RATIO MEASUREMENT: 4.9 RATIO — SIGNIFICANT CHANGE UP (ref 3.3–7.1)
TRIGL SERPL-MCNC: 155 MG/DL — HIGH (ref 10–149)
TRIGL SERPL-MCNC: 255 MG/DL — HIGH (ref 10–149)
TROPONIN I SERPL-MCNC: 0.29 NG/ML — HIGH (ref 0–0.04)
TROPONIN I SERPL-MCNC: 0.46 NG/ML — HIGH (ref 0–0.04)
TROPONIN I SERPL-MCNC: 0.49 NG/ML — HIGH (ref 0–0.04)
TROPONIN I SERPL-MCNC: <0.015 NG/ML — SIGNIFICANT CHANGE UP (ref 0–0.04)
TSH SERPL-MCNC: 0.24 UU/ML — LOW (ref 0.34–4.82)
TSH SERPL-MCNC: 0.47 UU/ML — SIGNIFICANT CHANGE UP (ref 0.34–4.82)
TSH SERPL-MCNC: 0.68 UU/ML — SIGNIFICANT CHANGE UP (ref 0.34–4.82)
UIBC SERPL-MCNC: 178 UG/DL — SIGNIFICANT CHANGE UP (ref 110–370)
UIBC SERPL-MCNC: 181 UG/DL — SIGNIFICANT CHANGE UP (ref 110–370)
UROBILINOGEN FLD QL: NEGATIVE — SIGNIFICANT CHANGE UP
UROBILINOGEN FLD QL: NEGATIVE — SIGNIFICANT CHANGE UP
UUN UR-MCNC: 459 MG/DL — SIGNIFICANT CHANGE UP
VIT B12 SERPL-MCNC: 460 PG/ML — SIGNIFICANT CHANGE UP (ref 232–1245)
VIT B12 SERPL-MCNC: 461 PG/ML — SIGNIFICANT CHANGE UP (ref 232–1245)
VIT B12 SERPL-MCNC: 888 PG/ML — SIGNIFICANT CHANGE UP (ref 232–1245)
WBC # BLD: 10.93 K/UL — HIGH (ref 3.8–10.5)
WBC # BLD: 4.71 K/UL — SIGNIFICANT CHANGE UP (ref 3.8–10.5)
WBC # BLD: 5.13 K/UL — SIGNIFICANT CHANGE UP (ref 3.8–10.5)
WBC # BLD: 5.32 K/UL — SIGNIFICANT CHANGE UP (ref 3.8–10.5)
WBC # BLD: 6.41 K/UL — SIGNIFICANT CHANGE UP (ref 3.8–10.5)
WBC # BLD: 6.46 K/UL — SIGNIFICANT CHANGE UP (ref 3.8–10.5)
WBC # BLD: 6.72 K/UL — SIGNIFICANT CHANGE UP (ref 3.8–10.5)
WBC # BLD: 6.78 K/UL — SIGNIFICANT CHANGE UP (ref 3.8–10.5)
WBC # BLD: 7.3 K/UL — SIGNIFICANT CHANGE UP (ref 3.8–10.5)
WBC # BLD: 7.84 K/UL — SIGNIFICANT CHANGE UP (ref 3.8–10.5)
WBC # BLD: 9.11 K/UL — SIGNIFICANT CHANGE UP (ref 3.8–10.5)
WBC # FLD AUTO: 10.93 K/UL — HIGH (ref 3.8–10.5)
WBC # FLD AUTO: 4.71 K/UL — SIGNIFICANT CHANGE UP (ref 3.8–10.5)
WBC # FLD AUTO: 5.13 K/UL — SIGNIFICANT CHANGE UP (ref 3.8–10.5)
WBC # FLD AUTO: 5.32 K/UL — SIGNIFICANT CHANGE UP (ref 3.8–10.5)
WBC # FLD AUTO: 6.41 K/UL — SIGNIFICANT CHANGE UP (ref 3.8–10.5)
WBC # FLD AUTO: 6.46 K/UL — SIGNIFICANT CHANGE UP (ref 3.8–10.5)
WBC # FLD AUTO: 6.72 K/UL — SIGNIFICANT CHANGE UP (ref 3.8–10.5)
WBC # FLD AUTO: 6.78 K/UL — SIGNIFICANT CHANGE UP (ref 3.8–10.5)
WBC # FLD AUTO: 7.3 K/UL — SIGNIFICANT CHANGE UP (ref 3.8–10.5)
WBC # FLD AUTO: 7.84 K/UL — SIGNIFICANT CHANGE UP (ref 3.8–10.5)
WBC # FLD AUTO: 9.11 K/UL — SIGNIFICANT CHANGE UP (ref 3.8–10.5)
WBC UR QL: >50 /HPF (ref 0–5)

## 2019-01-01 PROCEDURE — 71045 X-RAY EXAM CHEST 1 VIEW: CPT | Mod: 26

## 2019-01-01 PROCEDURE — 99285 EMERGENCY DEPT VISIT HI MDM: CPT

## 2019-01-01 PROCEDURE — 74176 CT ABD & PELVIS W/O CONTRAST: CPT

## 2019-01-01 PROCEDURE — 82962 GLUCOSE BLOOD TEST: CPT

## 2019-01-01 PROCEDURE — 86301 IMMUNOASSAY TUMOR CA 19-9: CPT

## 2019-01-01 PROCEDURE — 86803 HEPATITIS C AB TEST: CPT

## 2019-01-01 PROCEDURE — 83036 HEMOGLOBIN GLYCOSYLATED A1C: CPT

## 2019-01-01 PROCEDURE — 82378 CARCINOEMBRYONIC ANTIGEN: CPT

## 2019-01-01 PROCEDURE — 83540 ASSAY OF IRON: CPT

## 2019-01-01 PROCEDURE — 87340 HEPATITIS B SURFACE AG IA: CPT

## 2019-01-01 PROCEDURE — 84155 ASSAY OF PROTEIN SERUM: CPT

## 2019-01-01 PROCEDURE — 84443 ASSAY THYROID STIM HORMONE: CPT

## 2019-01-01 PROCEDURE — 76775 US EXAM ABDO BACK WALL LIM: CPT | Mod: 26

## 2019-01-01 PROCEDURE — 83615 LACTATE (LD) (LDH) ENZYME: CPT

## 2019-01-01 PROCEDURE — P9040: CPT

## 2019-01-01 PROCEDURE — 80053 COMPREHEN METABOLIC PANEL: CPT

## 2019-01-01 PROCEDURE — 81001 URINALYSIS AUTO W/SCOPE: CPT

## 2019-01-01 PROCEDURE — 87086 URINE CULTURE/COLONY COUNT: CPT

## 2019-01-01 PROCEDURE — 82436 ASSAY OF URINE CHLORIDE: CPT

## 2019-01-01 PROCEDURE — 86850 RBC ANTIBODY SCREEN: CPT

## 2019-01-01 PROCEDURE — 85610 PROTHROMBIN TIME: CPT

## 2019-01-01 PROCEDURE — 85045 AUTOMATED RETICULOCYTE COUNT: CPT

## 2019-01-01 PROCEDURE — 82728 ASSAY OF FERRITIN: CPT

## 2019-01-01 PROCEDURE — 80061 LIPID PANEL: CPT

## 2019-01-01 PROCEDURE — 82306 VITAMIN D 25 HYDROXY: CPT

## 2019-01-01 PROCEDURE — 86901 BLOOD TYPING SEROLOGIC RH(D): CPT

## 2019-01-01 PROCEDURE — 71045 X-RAY EXAM CHEST 1 VIEW: CPT

## 2019-01-01 PROCEDURE — 74176 CT ABD & PELVIS W/O CONTRAST: CPT | Mod: 26

## 2019-01-01 PROCEDURE — 84156 ASSAY OF PROTEIN URINE: CPT

## 2019-01-01 PROCEDURE — 84300 ASSAY OF URINE SODIUM: CPT

## 2019-01-01 PROCEDURE — 83010 ASSAY OF HAPTOGLOBIN QUANT: CPT

## 2019-01-01 PROCEDURE — 85027 COMPLETE CBC AUTOMATED: CPT

## 2019-01-01 PROCEDURE — 85652 RBC SED RATE AUTOMATED: CPT

## 2019-01-01 PROCEDURE — 80048 BASIC METABOLIC PNL TOTAL CA: CPT

## 2019-01-01 PROCEDURE — 96374 THER/PROPH/DIAG INJ IV PUSH: CPT

## 2019-01-01 PROCEDURE — 86880 COOMBS TEST DIRECT: CPT

## 2019-01-01 PROCEDURE — 99285 EMERGENCY DEPT VISIT HI MDM: CPT | Mod: 25

## 2019-01-01 PROCEDURE — 83690 ASSAY OF LIPASE: CPT

## 2019-01-01 PROCEDURE — 84100 ASSAY OF PHOSPHORUS: CPT

## 2019-01-01 PROCEDURE — 83735 ASSAY OF MAGNESIUM: CPT

## 2019-01-01 PROCEDURE — 36415 COLL VENOUS BLD VENIPUNCTURE: CPT

## 2019-01-01 PROCEDURE — 93005 ELECTROCARDIOGRAM TRACING: CPT

## 2019-01-01 PROCEDURE — 76775 US EXAM ABDO BACK WALL LIM: CPT

## 2019-01-01 PROCEDURE — 83550 IRON BINDING TEST: CPT

## 2019-01-01 PROCEDURE — 93306 TTE W/DOPPLER COMPLETE: CPT

## 2019-01-01 PROCEDURE — 82272 OCCULT BLD FECES 1-3 TESTS: CPT

## 2019-01-01 PROCEDURE — 86334 IMMUNOFIX E-PHORESIS SERUM: CPT

## 2019-01-01 PROCEDURE — 36430 TRANSFUSION BLD/BLD COMPNT: CPT

## 2019-01-01 PROCEDURE — 82607 VITAMIN B-12: CPT

## 2019-01-01 PROCEDURE — 87186 SC STD MICRODIL/AGAR DIL: CPT

## 2019-01-01 PROCEDURE — 84484 ASSAY OF TROPONIN QUANT: CPT

## 2019-01-01 PROCEDURE — 84540 ASSAY OF URINE/UREA-N: CPT

## 2019-01-01 PROCEDURE — 82803 BLOOD GASES ANY COMBINATION: CPT

## 2019-01-01 PROCEDURE — 82570 ASSAY OF URINE CREATININE: CPT

## 2019-01-01 PROCEDURE — 86900 BLOOD TYPING SEROLOGIC ABO: CPT

## 2019-01-01 PROCEDURE — 86923 COMPATIBILITY TEST ELECTRIC: CPT

## 2019-01-01 PROCEDURE — 85730 THROMBOPLASTIN TIME PARTIAL: CPT

## 2019-01-01 PROCEDURE — 84165 PROTEIN E-PHORESIS SERUM: CPT

## 2019-01-01 PROCEDURE — 83521 IG LIGHT CHAINS FREE EACH: CPT

## 2019-01-01 PROCEDURE — 82746 ASSAY OF FOLIC ACID SERUM: CPT

## 2019-01-01 RX ORDER — HEPARIN SODIUM 5000 [USP'U]/ML
5000 INJECTION INTRAVENOUS; SUBCUTANEOUS EVERY 8 HOURS
Refills: 0 | Status: DISCONTINUED | OUTPATIENT
Start: 2019-01-01 | End: 2019-01-01

## 2019-01-01 RX ORDER — CEFUROXIME AXETIL 250 MG
1 TABLET ORAL
Qty: 8 | Refills: 0
Start: 2019-01-01 | End: 2019-01-01

## 2019-01-01 RX ORDER — OXYBUTYNIN CHLORIDE 5 MG
10 TABLET ORAL DAILY
Refills: 0 | Status: DISCONTINUED | OUTPATIENT
Start: 2019-01-01 | End: 2019-01-01

## 2019-01-01 RX ORDER — PANTOPRAZOLE SODIUM 20 MG/1
1 TABLET, DELAYED RELEASE ORAL
Qty: 30 | Refills: 0
Start: 2019-01-01 | End: 2019-01-01

## 2019-01-01 RX ORDER — SODIUM CHLORIDE 9 MG/ML
1000 INJECTION INTRAMUSCULAR; INTRAVENOUS; SUBCUTANEOUS
Refills: 0 | Status: DISCONTINUED | OUTPATIENT
Start: 2019-01-01 | End: 2019-01-01

## 2019-01-01 RX ORDER — CEFUROXIME AXETIL 250 MG
1 TABLET ORAL
Qty: 14 | Refills: 0
Start: 2019-01-01 | End: 2019-01-01

## 2019-01-01 RX ORDER — ATORVASTATIN CALCIUM 80 MG/1
40 TABLET, FILM COATED ORAL AT BEDTIME
Refills: 0 | Status: DISCONTINUED | OUTPATIENT
Start: 2019-01-01 | End: 2019-01-01

## 2019-01-01 RX ORDER — METOPROLOL TARTRATE 50 MG
1 TABLET ORAL
Qty: 0 | Refills: 0 | DISCHARGE

## 2019-01-01 RX ORDER — CEFTRIAXONE 500 MG/1
1 INJECTION, POWDER, FOR SOLUTION INTRAMUSCULAR; INTRAVENOUS ONCE
Refills: 0 | Status: COMPLETED | OUTPATIENT
Start: 2019-01-01 | End: 2019-01-01

## 2019-01-01 RX ORDER — ASPIRIN/CALCIUM CARB/MAGNESIUM 324 MG
81 TABLET ORAL DAILY
Refills: 0 | Status: DISCONTINUED | OUTPATIENT
Start: 2019-01-01 | End: 2019-01-01

## 2019-01-01 RX ORDER — PREGABALIN 225 MG/1
1000 CAPSULE ORAL DAILY
Refills: 0 | Status: DISCONTINUED | OUTPATIENT
Start: 2019-01-01 | End: 2019-01-01

## 2019-01-01 RX ORDER — MONTELUKAST 4 MG/1
10 TABLET, CHEWABLE ORAL AT BEDTIME
Refills: 0 | Status: DISCONTINUED | OUTPATIENT
Start: 2019-01-01 | End: 2019-01-01

## 2019-01-01 RX ORDER — CHOLECALCIFEROL (VITAMIN D3) 125 MCG
1 CAPSULE ORAL
Qty: 30 | Refills: 0
Start: 2019-01-01 | End: 2019-01-01

## 2019-01-01 RX ORDER — PREGABALIN 225 MG/1
1 CAPSULE ORAL
Qty: 30 | Refills: 0
Start: 2019-01-01 | End: 2019-01-01

## 2019-01-01 RX ORDER — IRON SUCROSE 20 MG/ML
200 INJECTION, SOLUTION INTRAVENOUS ONCE
Refills: 0 | Status: COMPLETED | OUTPATIENT
Start: 2019-01-01 | End: 2019-01-01

## 2019-01-01 RX ORDER — MONTELUKAST 4 MG/1
1 TABLET, CHEWABLE ORAL
Qty: 14 | Refills: 0
Start: 2019-01-01 | End: 2019-01-01

## 2019-01-01 RX ORDER — CEFTRIAXONE 500 MG/1
1000 INJECTION, POWDER, FOR SOLUTION INTRAMUSCULAR; INTRAVENOUS EVERY 24 HOURS
Refills: 0 | Status: DISCONTINUED | OUTPATIENT
Start: 2019-01-01 | End: 2019-01-01

## 2019-01-01 RX ORDER — ENOXAPARIN SODIUM 100 MG/ML
40 INJECTION SUBCUTANEOUS DAILY
Refills: 0 | Status: DISCONTINUED | OUTPATIENT
Start: 2019-01-01 | End: 2019-01-01

## 2019-01-01 RX ORDER — CEFTRIAXONE 500 MG/1
1000 INJECTION, POWDER, FOR SOLUTION INTRAMUSCULAR; INTRAVENOUS ONCE
Refills: 0 | Status: COMPLETED | OUTPATIENT
Start: 2019-01-01 | End: 2019-01-01

## 2019-01-01 RX ORDER — SODIUM CHLORIDE 9 MG/ML
1000 INJECTION INTRAMUSCULAR; INTRAVENOUS; SUBCUTANEOUS ONCE
Refills: 0 | Status: COMPLETED | OUTPATIENT
Start: 2019-01-01 | End: 2019-01-01

## 2019-01-01 RX ORDER — METOPROLOL TARTRATE 50 MG
12.5 TABLET ORAL DAILY
Refills: 0 | Status: DISCONTINUED | OUTPATIENT
Start: 2019-01-01 | End: 2019-01-01

## 2019-01-01 RX ORDER — ACETAMINOPHEN 500 MG
1000 TABLET ORAL ONCE
Refills: 0 | Status: COMPLETED | OUTPATIENT
Start: 2019-01-01 | End: 2019-01-01

## 2019-01-01 RX ORDER — ONDANSETRON 8 MG/1
4 TABLET, FILM COATED ORAL ONCE
Refills: 0 | Status: COMPLETED | OUTPATIENT
Start: 2019-01-01 | End: 2019-01-01

## 2019-01-01 RX ORDER — LOSARTAN POTASSIUM 100 MG/1
1 TABLET, FILM COATED ORAL
Qty: 0 | Refills: 0 | DISCHARGE

## 2019-01-01 RX ORDER — CEFTRIAXONE 500 MG/1
1 INJECTION, POWDER, FOR SOLUTION INTRAMUSCULAR; INTRAVENOUS EVERY 24 HOURS
Refills: 0 | Status: DISCONTINUED | OUTPATIENT
Start: 2019-01-01 | End: 2019-01-01

## 2019-01-01 RX ORDER — PANTOPRAZOLE SODIUM 20 MG/1
40 TABLET, DELAYED RELEASE ORAL
Refills: 0 | Status: DISCONTINUED | OUTPATIENT
Start: 2019-01-01 | End: 2019-01-01

## 2019-01-01 RX ORDER — ACETAMINOPHEN WITH CODEINE 300MG-30MG
1 TABLET ORAL
Qty: 0 | Refills: 0 | DISCHARGE

## 2019-01-01 RX ORDER — METOPROLOL TARTRATE 50 MG
0.5 TABLET ORAL
Qty: 10.5 | Refills: 0
Start: 2019-01-01 | End: 2019-01-01

## 2019-01-01 RX ORDER — OXYBUTYNIN CHLORIDE 5 MG
10 TABLET ORAL
Refills: 0 | Status: DISCONTINUED | OUTPATIENT
Start: 2019-01-01 | End: 2019-01-01

## 2019-01-01 RX ORDER — ACETAMINOPHEN WITH CODEINE 300MG-30MG
1 TABLET ORAL EVERY 6 HOURS
Refills: 0 | Status: DISCONTINUED | OUTPATIENT
Start: 2019-01-01 | End: 2019-01-01

## 2019-01-01 RX ORDER — IRON SUCROSE 20 MG/ML
200 INJECTION, SOLUTION INTRAVENOUS EVERY 24 HOURS
Refills: 0 | Status: DISCONTINUED | OUTPATIENT
Start: 2019-01-01 | End: 2019-01-01

## 2019-01-01 RX ORDER — CHOLECALCIFEROL (VITAMIN D3) 125 MCG
1000 CAPSULE ORAL DAILY
Refills: 0 | Status: DISCONTINUED | OUTPATIENT
Start: 2019-01-01 | End: 2019-01-01

## 2019-01-01 RX ORDER — ERGOCALCIFEROL 1.25 MG/1
50000 CAPSULE ORAL
Refills: 0 | Status: DISCONTINUED | OUTPATIENT
Start: 2019-01-01 | End: 2019-01-01

## 2019-01-01 RX ORDER — PANTOPRAZOLE SODIUM 20 MG/1
40 TABLET, DELAYED RELEASE ORAL DAILY
Refills: 0 | Status: DISCONTINUED | OUTPATIENT
Start: 2019-01-01 | End: 2019-01-01

## 2019-01-01 RX ORDER — CEFTRIAXONE 500 MG/1
INJECTION, POWDER, FOR SOLUTION INTRAMUSCULAR; INTRAVENOUS
Refills: 0 | Status: DISCONTINUED | OUTPATIENT
Start: 2019-01-01 | End: 2019-01-01

## 2019-01-01 RX ORDER — METOPROLOL TARTRATE 50 MG
25 TABLET ORAL
Refills: 0 | Status: DISCONTINUED | OUTPATIENT
Start: 2019-01-01 | End: 2019-01-01

## 2019-01-01 RX ORDER — ATORVASTATIN CALCIUM 80 MG/1
1 TABLET, FILM COATED ORAL
Qty: 0 | Refills: 0 | DISCHARGE

## 2019-01-01 RX ORDER — SOD SULF/SODIUM/NAHCO3/KCL/PEG
4000 SOLUTION, RECONSTITUTED, ORAL ORAL ONCE
Refills: 0 | Status: COMPLETED | OUTPATIENT
Start: 2019-01-01 | End: 2019-01-01

## 2019-01-01 RX ORDER — SOLIFENACIN SUCCINATE 10 MG/1
1 TABLET ORAL
Qty: 0 | Refills: 0 | DISCHARGE

## 2019-01-01 RX ORDER — LOSARTAN POTASSIUM 100 MG/1
25 TABLET, FILM COATED ORAL DAILY
Refills: 0 | Status: DISCONTINUED | OUTPATIENT
Start: 2019-01-01 | End: 2019-01-01

## 2019-01-01 RX ORDER — SODIUM CHLORIDE 9 MG/ML
1000 INJECTION, SOLUTION INTRAVENOUS
Refills: 0 | Status: DISCONTINUED | OUTPATIENT
Start: 2019-01-01 | End: 2019-01-01

## 2019-01-01 RX ORDER — ALENDRONATE SODIUM 70 MG/1
1 TABLET ORAL
Qty: 0 | Refills: 0 | DISCHARGE

## 2019-01-01 RX ADMIN — CEFTRIAXONE 100 GRAM(S): 500 INJECTION, POWDER, FOR SOLUTION INTRAMUSCULAR; INTRAVENOUS at 08:21

## 2019-01-01 RX ADMIN — Medication 10 MILLIGRAM(S): at 11:22

## 2019-01-01 RX ADMIN — HEPARIN SODIUM 5000 UNIT(S): 5000 INJECTION INTRAVENOUS; SUBCUTANEOUS at 14:34

## 2019-01-01 RX ADMIN — HEPARIN SODIUM 5000 UNIT(S): 5000 INJECTION INTRAVENOUS; SUBCUTANEOUS at 22:46

## 2019-01-01 RX ADMIN — Medication 10 MILLIGRAM(S): at 12:45

## 2019-01-01 RX ADMIN — HEPARIN SODIUM 5000 UNIT(S): 5000 INJECTION INTRAVENOUS; SUBCUTANEOUS at 05:33

## 2019-01-01 RX ADMIN — HEPARIN SODIUM 5000 UNIT(S): 5000 INJECTION INTRAVENOUS; SUBCUTANEOUS at 13:55

## 2019-01-01 RX ADMIN — Medication 25 MILLIGRAM(S): at 06:30

## 2019-01-01 RX ADMIN — Medication 1000 MILLIGRAM(S): at 18:29

## 2019-01-01 RX ADMIN — ATORVASTATIN CALCIUM 40 MILLIGRAM(S): 80 TABLET, FILM COATED ORAL at 21:32

## 2019-01-01 RX ADMIN — CEFTRIAXONE 100 GRAM(S): 500 INJECTION, POWDER, FOR SOLUTION INTRAMUSCULAR; INTRAVENOUS at 12:32

## 2019-01-01 RX ADMIN — CEFTRIAXONE 100 MILLIGRAM(S): 500 INJECTION, POWDER, FOR SOLUTION INTRAMUSCULAR; INTRAVENOUS at 10:16

## 2019-01-01 RX ADMIN — PANTOPRAZOLE SODIUM 40 MILLIGRAM(S): 20 TABLET, DELAYED RELEASE ORAL at 17:16

## 2019-01-01 RX ADMIN — Medication 10 MILLIGRAM(S): at 06:11

## 2019-01-01 RX ADMIN — Medication 400 MILLIGRAM(S): at 15:38

## 2019-01-01 RX ADMIN — Medication 4000 MILLILITER(S): at 18:48

## 2019-01-01 RX ADMIN — Medication 10 MILLIGRAM(S): at 12:32

## 2019-01-01 RX ADMIN — SODIUM CHLORIDE 75 MILLILITER(S): 9 INJECTION INTRAMUSCULAR; INTRAVENOUS; SUBCUTANEOUS at 05:19

## 2019-01-01 RX ADMIN — PANTOPRAZOLE SODIUM 40 MILLIGRAM(S): 20 TABLET, DELAYED RELEASE ORAL at 05:54

## 2019-01-01 RX ADMIN — Medication 12.5 MILLIGRAM(S): at 12:45

## 2019-01-01 RX ADMIN — Medication 25 MILLIGRAM(S): at 17:22

## 2019-01-01 RX ADMIN — Medication 81 MILLIGRAM(S): at 12:32

## 2019-01-01 RX ADMIN — PANTOPRAZOLE SODIUM 40 MILLIGRAM(S): 20 TABLET, DELAYED RELEASE ORAL at 18:24

## 2019-01-01 RX ADMIN — Medication 81 MILLIGRAM(S): at 12:15

## 2019-01-01 RX ADMIN — Medication 81 MILLIGRAM(S): at 13:55

## 2019-01-01 RX ADMIN — IRON SUCROSE 110 MILLIGRAM(S): 20 INJECTION, SOLUTION INTRAVENOUS at 17:54

## 2019-01-01 RX ADMIN — SODIUM CHLORIDE 1000 MILLILITER(S): 9 INJECTION INTRAMUSCULAR; INTRAVENOUS; SUBCUTANEOUS at 15:38

## 2019-01-01 RX ADMIN — PREGABALIN 1000 MICROGRAM(S): 225 CAPSULE ORAL at 17:52

## 2019-01-01 RX ADMIN — HEPARIN SODIUM 5000 UNIT(S): 5000 INJECTION INTRAVENOUS; SUBCUTANEOUS at 06:12

## 2019-01-01 RX ADMIN — PANTOPRAZOLE SODIUM 40 MILLIGRAM(S): 20 TABLET, DELAYED RELEASE ORAL at 05:28

## 2019-01-01 RX ADMIN — Medication 10 MILLIGRAM(S): at 06:31

## 2019-01-01 RX ADMIN — ATORVASTATIN CALCIUM 40 MILLIGRAM(S): 80 TABLET, FILM COATED ORAL at 21:13

## 2019-01-01 RX ADMIN — ATORVASTATIN CALCIUM 40 MILLIGRAM(S): 80 TABLET, FILM COATED ORAL at 22:13

## 2019-01-01 RX ADMIN — Medication 10 MILLIGRAM(S): at 05:33

## 2019-01-01 RX ADMIN — Medication 1000 MILLIGRAM(S): at 18:30

## 2019-01-01 RX ADMIN — PANTOPRAZOLE SODIUM 40 MILLIGRAM(S): 20 TABLET, DELAYED RELEASE ORAL at 05:36

## 2019-01-01 RX ADMIN — PREGABALIN 1000 MICROGRAM(S): 225 CAPSULE ORAL at 13:55

## 2019-01-01 RX ADMIN — Medication 81 MILLIGRAM(S): at 12:45

## 2019-01-01 RX ADMIN — Medication 81 MILLIGRAM(S): at 11:05

## 2019-01-01 RX ADMIN — Medication 10 MILLIGRAM(S): at 17:20

## 2019-01-01 RX ADMIN — ENOXAPARIN SODIUM 40 MILLIGRAM(S): 100 INJECTION SUBCUTANEOUS at 12:45

## 2019-01-01 RX ADMIN — Medication 10 MILLIGRAM(S): at 17:54

## 2019-01-01 RX ADMIN — Medication 10 MILLIGRAM(S): at 12:15

## 2019-01-01 RX ADMIN — ENOXAPARIN SODIUM 40 MILLIGRAM(S): 100 INJECTION SUBCUTANEOUS at 12:15

## 2019-01-01 RX ADMIN — Medication 81 MILLIGRAM(S): at 11:22

## 2019-01-01 RX ADMIN — ATORVASTATIN CALCIUM 40 MILLIGRAM(S): 80 TABLET, FILM COATED ORAL at 21:38

## 2019-01-01 RX ADMIN — Medication 20 MILLIGRAM(S): at 22:13

## 2019-01-01 RX ADMIN — PANTOPRAZOLE SODIUM 40 MILLIGRAM(S): 20 TABLET, DELAYED RELEASE ORAL at 06:13

## 2019-01-01 RX ADMIN — HEPARIN SODIUM 5000 UNIT(S): 5000 INJECTION INTRAVENOUS; SUBCUTANEOUS at 05:54

## 2019-01-01 RX ADMIN — PANTOPRAZOLE SODIUM 40 MILLIGRAM(S): 20 TABLET, DELAYED RELEASE ORAL at 05:43

## 2019-01-01 RX ADMIN — Medication 25 MILLIGRAM(S): at 05:54

## 2019-01-01 RX ADMIN — PANTOPRAZOLE SODIUM 40 MILLIGRAM(S): 20 TABLET, DELAYED RELEASE ORAL at 21:38

## 2019-01-01 RX ADMIN — ONDANSETRON 4 MILLIGRAM(S): 8 TABLET, FILM COATED ORAL at 09:47

## 2019-01-01 RX ADMIN — Medication 25 MILLIGRAM(S): at 06:13

## 2019-01-01 RX ADMIN — ATORVASTATIN CALCIUM 40 MILLIGRAM(S): 80 TABLET, FILM COATED ORAL at 22:28

## 2019-01-01 RX ADMIN — HEPARIN SODIUM 5000 UNIT(S): 5000 INJECTION INTRAVENOUS; SUBCUTANEOUS at 22:28

## 2019-01-01 RX ADMIN — SODIUM CHLORIDE 1000 MILLILITER(S): 9 INJECTION INTRAMUSCULAR; INTRAVENOUS; SUBCUTANEOUS at 18:29

## 2019-01-01 RX ADMIN — Medication 10 MILLIGRAM(S): at 05:54

## 2019-01-01 RX ADMIN — PANTOPRAZOLE SODIUM 40 MILLIGRAM(S): 20 TABLET, DELAYED RELEASE ORAL at 18:04

## 2019-01-01 RX ADMIN — ERGOCALCIFEROL 50000 UNIT(S): 1.25 CAPSULE ORAL at 17:53

## 2019-01-01 RX ADMIN — ATORVASTATIN CALCIUM 40 MILLIGRAM(S): 80 TABLET, FILM COATED ORAL at 22:46

## 2019-01-01 RX ADMIN — PANTOPRAZOLE SODIUM 40 MILLIGRAM(S): 20 TABLET, DELAYED RELEASE ORAL at 05:32

## 2019-01-01 RX ADMIN — Medication 10 MILLIGRAM(S): at 17:22

## 2019-01-01 RX ADMIN — CEFTRIAXONE 100 MILLIGRAM(S): 500 INJECTION, POWDER, FOR SOLUTION INTRAMUSCULAR; INTRAVENOUS at 12:15

## 2019-01-01 RX ADMIN — CEFTRIAXONE 100 GRAM(S): 500 INJECTION, POWDER, FOR SOLUTION INTRAMUSCULAR; INTRAVENOUS at 09:13

## 2019-01-01 RX ADMIN — PANTOPRAZOLE SODIUM 40 MILLIGRAM(S): 20 TABLET, DELAYED RELEASE ORAL at 06:30

## 2019-01-01 RX ADMIN — SODIUM CHLORIDE 1000 MILLILITER(S): 9 INJECTION INTRAMUSCULAR; INTRAVENOUS; SUBCUTANEOUS at 18:46

## 2019-01-01 RX ADMIN — ENOXAPARIN SODIUM 40 MILLIGRAM(S): 100 INJECTION SUBCUTANEOUS at 11:22

## 2019-01-01 RX ADMIN — HEPARIN SODIUM 5000 UNIT(S): 5000 INJECTION INTRAVENOUS; SUBCUTANEOUS at 22:13

## 2019-01-01 RX ADMIN — CEFTRIAXONE 100 MILLIGRAM(S): 500 INJECTION, POWDER, FOR SOLUTION INTRAMUSCULAR; INTRAVENOUS at 12:34

## 2019-01-01 RX ADMIN — ATORVASTATIN CALCIUM 40 MILLIGRAM(S): 80 TABLET, FILM COATED ORAL at 22:21

## 2019-01-01 RX ADMIN — CEFTRIAXONE 100 MILLIGRAM(S): 500 INJECTION, POWDER, FOR SOLUTION INTRAMUSCULAR; INTRAVENOUS at 11:17

## 2019-01-01 RX ADMIN — Medication 25 MILLIGRAM(S): at 18:23

## 2019-01-01 RX ADMIN — Medication 25 MILLIGRAM(S): at 17:15

## 2019-01-01 RX ADMIN — ENOXAPARIN SODIUM 40 MILLIGRAM(S): 100 INJECTION SUBCUTANEOUS at 12:32

## 2019-01-01 RX ADMIN — ATORVASTATIN CALCIUM 40 MILLIGRAM(S): 80 TABLET, FILM COATED ORAL at 21:37

## 2019-01-01 RX ADMIN — PANTOPRAZOLE SODIUM 40 MILLIGRAM(S): 20 TABLET, DELAYED RELEASE ORAL at 05:44

## 2019-01-01 RX ADMIN — Medication 25 MILLIGRAM(S): at 18:04

## 2019-05-12 NOTE — H&P ADULT - NEUROLOGICAL DETAILS
deep reflexes intact/sensation intact/superficial reflexes intact/alert and oriented x 3/cranial nerves intact

## 2019-05-12 NOTE — H&P ADULT - PROBLEM SELECTOR PLAN 4
Pt takes ASA,statin and plavix at home   - will hold off plavix given low Hb and stent is old ( 2014)   - cont with ASA and statin

## 2019-05-12 NOTE — ED PROVIDER NOTE - PROGRESS NOTE DETAILS
Patient's Hgb 7.5. Fecal occult negative. Will admit to Dr. Dobbs for further eval. Sign out complete with Dr. Ramirez.

## 2019-05-12 NOTE — H&P ADULT - ATTENDING COMMENTS
Patient seen and examined. Case fully discussed with  ER attending and medical resident. Reviewed chief complaint. Reviewed review of systems. Reviewed list of medications.  Reviewed physical exam.  Reviewed assessment and plan. agree with full H and P. Will follow up clinically. Continue Iron.  Follow up CBC.  Follow up with GI.

## 2019-05-12 NOTE — H&P ADULT - NSICDXPASTMEDICALHX_GEN_ALL_CORE_FT
PAST MEDICAL HISTORY:  Arthritis     CAD (coronary artery disease)     History of hypertension     HLD (hyperlipidemia)     S/P cardiac catheterization stent x 1

## 2019-05-12 NOTE — H&P ADULT - ASSESSMENT
74 y/o F Somali speaking from home, walks with cane, no HHA ,lives with daughter with PMHx of HTN, HLD, CAD (1 stent in 2014), and arthritis presents to the ED with c/o  generalized weakness and pale skin x 1 week    ED course :   Hb 7.5   FOBT -ve   Cr 1 72 y/o F Prydeinig speaking from home, walks with cane, no HHA ,lives with daughter with PMHx of HTN, HLD, CAD (1 stent in 2014), and arthritis presents to the ED with c/o  generalized weakness and pale skin x 1 week    ED course :   Hb 7.5   FOBT -ve     iron studies : Fe low , TIBC low , % sat low   Cr 1.61  CXR clear     Pt will be admitted to med for symptomatic anemia and JENI

## 2019-05-12 NOTE — ED ADULT NURSE NOTE - NSIMPLEMENTINTERV_GEN_ALL_ED
Implemented All Universal Safety Interventions:  [place] to call system. Call bell, personal items and telephone within reach. Instruct patient to call for assistance. Room bathroom lighting operational. Non-slip footwear when patient is off stretcher. Physically safe environment: no spills, clutter or unnecessary equipment. Stretcher in lowest position, wheels locked, appropriate side rails in place.

## 2019-05-12 NOTE — ED ADULT NURSE NOTE - ED STAT RN HANDOFF DETAILS 2
received  pt.in bed at 1910  pt.is awake and responsive.denies  pain. HGB 7.5 no active bleeding noted. transfer to holding area.report given suri rn.not  in distress

## 2019-05-12 NOTE — H&P ADULT - PROBLEM SELECTOR PLAN 2
no know h/o CKD   Cr 1.61 ,can be from poor PO intake   will order U lytes and UA   - Monitor BMP   - will start on gentle hydration for 12 hrs

## 2019-05-12 NOTE — H&P ADULT - HISTORY OF PRESENT ILLNESS
72 y/o female with PMHx of HTN, HLD, CAD (1 stent in place), and arthritis presents to the ED with c/o 1 week of generalized weakness. Pt states that she woke up today with sternal, non-radiating chest pressure associated with mild SOB. Pt notes mild lightheadedness, but denies any nausea, vomiting, or other complaints. Pt notes aspirin and Plavix use. Pt declined phone translation, opted for daughter to translate instead. 72 y/o F Moldovan speaking from home, walks with cane, no HHA ,lives with daughter with PMHx of HTN, HLD, CAD (1 stent in 2014), and arthritis presents to the ED with c/o  generalized weakness and pale skin x 1 week.History obtained from the daughter at bedside as pt didnt wanted the  services. Daughter states that pt went for blood test with her PMD 1 week ago who told her that Hb is 9.5.Also since 1 week pt is easily getting fatigued, SOB on ambulation and is not waking as much as used to and her appetite has decreased. Pt also reports mild chest discomfort since morning which has resolved now .  Pt denies any lightheadedness, nausea, vomiting,fever , Cough, dark stools or other complaints.  Pt never had any colonoscopy done . She had EGD done 4 yrs ago which showed gastritis and some hiatal hernia.

## 2019-05-12 NOTE — ED PROVIDER NOTE - PMH
Arthritis    CAD (coronary artery disease)    History of hypertension    HLD (hyperlipidemia)    S/P cardiac catheterization  stent x 1

## 2019-05-12 NOTE — H&P ADULT - PROBLEM SELECTOR PLAN 3
pt takes losartan and loppresor at home   - will hold ACEi in setting of JENI   - c/w lopressor for now   - monitor BP , will add CCB if BP not controlled   - f/u TTE pt takes losartan and loppresor at home   - BP running on low side   - will hold ACEi in setting of JENI   - c/w lopressor for now with parameters  - monitor BP , will add CCB if BP not controlled   - f/u TTE

## 2019-05-12 NOTE — ED PROVIDER NOTE - OBJECTIVE STATEMENT
74 y/o female with PMHx of HTN, HLD, CAD (1 stent in place), and arthritis presents to the ED with c/o 1 week of generalized weakness. Pt states that she woke up today with sternal, non-radiating chest pressure associated with mild SOB. Pt notes mild lightheadedness, but denies any nausea, vomiting, or other complaints. Pt notes aspirin and Plavix use. Pt declined phone translation, opted for daughter to translate instead.

## 2019-05-12 NOTE — H&P ADULT - PROBLEM SELECTOR PLAN 1
p/w weakness, pale skin, loss of appetite, chest discomfort   - Hb 1 week ago with PMD 9.5, now Hb 7.5   - pt denies any dark stools, active bleeding  - D/d :combination of  chronic blood loss ,ACD ( h/o arthritis) and nutritional def   - Iron studies : Fe low, TIBC low , % sat low  - FOBT -ve   - likely ACD   - pt never had colonoscopy , EGD 4 yrs ago showed gastritis only, pt also on dual antiplatelet therapy   - will repeat FOBT   - will start on venofer 200mg for 3 days and Protonix 40 mg daily PO   - f/u TTE , PT eval , Vit B 12 , folate , Vit D and TSH   - f/u CBC daily   - GI consult Dr Argueta   - Nutritional consult for poor appetite p/w weakness, pale skin, loss of appetite, chest discomfort   - Hb 1 week ago with PMD 9.5, now Hb 7.5   - pt denies any dark stools, active bleeding  - D/d :combination of  chronic blood loss ,ACD ( h/o arthritis) and nutritional def   - Iron studies : Fe low, TIBC low , % sat low  - FOBT -ve   - pt never had colonoscopy , EGD 4 yrs ago showed gastritis only, pt also on dual antiplatelet therapy   - will repeat FOBT   - will start on venofer 200mg for 3 days and Protonix 40 mg daily PO   - f/u TTE , PT eval , Vit B 12 , folate , Vit D and TSH   - f/u CBC daily   - GI consult Dr Argueta   - Nutritional consult for poor appetite p/w weakness, pale skin, loss of appetite, chest discomfort, h/o CAD   - Hb 1 week ago with PMD 9.5, now Hb 7.5   - pt denies any dark stools, active bleeding  - D/d :combination of  chronic blood loss ,ACD ( h/o arthritis) and nutritional def   - Iron studies : Fe low, TIBC low , % sat low  - FOBT -ve   - pt never had colonoscopy , EGD 4 yrs ago showed gastritis only, pt also on dual antiplatelet therapy   - will repeat FOBT   - will transfuse 1 unit as she has h/o CAD , goal >8  -  Protonix 40 mg daily PO   - f/u TTE , PT eval , Vit B 12 , folate , Vit D and TSH   - f/u CBC daily   - GI consult Dr Argueta   - Nutritional consult for poor appetite

## 2019-05-13 NOTE — CONSULT NOTE ADULT - ASSESSMENT
74 y/o F Prydeinig speaking from home, walks with cane, no HHA ,lives with daughter with PMHx of HTN, HLD, CAD (1 stent in 2014), and arthritis presents to the ED with c/o  generalized weakness and pale skin x 1 ,symptomatic anemia,Occult+.  1.Tele monitoring.  2.GI eval called.  3.Ct abd and pelvis-ordered.  4.CAD-asa,b blocker,statin.  5.Check anemia profile,cea.  6.HTN-b blocker.  7.Anemia-transfuse if HG less than 7.0.  8.PPI.  9.Echocardiogram
1. Anemia associated with positive occult blood in stool.  1. R/o colorectal neoplasm  2. R/o Peptic ulcer disease  3. R/o AVM's  4. R/o rectal ulcer    Suggestions:    1. Monitor H/H  2. Transfuse PRBC as needed  3. Protonix daily  4. Avoid NSAID  5. EGD ans Colonoscopy  6. Check CEA  7. DVT prophylaxis

## 2019-05-13 NOTE — PROGRESS NOTE ADULT - PROBLEM SELECTOR PLAN 3
BP running on low side   - hold ACEi in setting of JENI   - c/w lopressor for now with parameters  - monitor BP   - f/u TTE.

## 2019-05-13 NOTE — PROGRESS NOTE ADULT - PROBLEM SELECTOR PLAN 2
no know h/o CKD   Cr 1.61 ,can be from poor PO intake, resolved with hydration   - f/u  Urine lytes and UA   - Monitor BMP

## 2019-05-13 NOTE — PROGRESS NOTE ADULT - PROBLEM SELECTOR PLAN 5
takes ASA, statin and plavix at home   - hold off plavix given low Hb and stent is old ( 2014)   - cont with ASA and statin.

## 2019-05-13 NOTE — PROGRESS NOTE ADULT - PROBLEM SELECTOR PLAN 1
Hb 7.5   concerning for chronic blood loss ,ACD, nutritional deficit   FOBT positive   sp 1 unit of PRBC, now hg 7.8   -monitor CBC, transfuse as needed   -f/u abd/pelvis CT scan   - Protonix 40 mg daily PO   - f/u TTE   - GI consult Dr Argueta   - Nutritional consult   - cardiology consult Dr milian

## 2019-05-13 NOTE — PROGRESS NOTE ADULT - SUBJECTIVE AND OBJECTIVE BOX
NP Note discussed with  Primary Attending    72 y/o lives with daughter with PMHx of HTN, HLD, CAD (1 stent in ), and arthritis presents to the ED with c/o  generalized weakness and fatigue, no appetite and abdominal pain.  Recent Hb is 9.5. She had EGD done 4 yrs ago which showed gastritis. Never had colonoscopy  On admission Hb 7.5.   Cr 1.61, CXR clear, FOBT positive. Pt  denies active bleeding  admitted for symptomatic anemia.  Pt seen at bedside, states feeling better, sp 1 unit PRBC. Hg 7.8   Pending abd/pelvic CT scan. Denies chest pain, SOB, blood in stool or vomiting       INTERVAL HPI/OVERNIGHT EVENTS: no new complaints    MEDICATIONS  (STANDING):  aspirin  chewable 81 milliGRAM(s) Oral daily  atorvastatin 40 milliGRAM(s) Oral at bedtime  heparin  Injectable 5000 Unit(s) SubCutaneous every 8 hours  metoprolol tartrate 25 milliGRAM(s) Oral two times a day  oxybutynin 10 milliGRAM(s) Oral two times a day  pantoprazole    Tablet 40 milliGRAM(s) Oral before breakfast  sodium chloride 0.9%. 1000 milliLiter(s) (75 mL/Hr) IV Continuous <Continuous>    MEDICATIONS  (PRN):  acetaminophen 300 mG/codeine 30 mG 1 Tablet(s) Oral every 6 hours PRN Moderate Pain (4 - 6)      __________________________________________________  REVIEW OF SYSTEMS:    CONSTITUTIONAL: No fever,   EYES: no acute visual disturbances  NECK: No pain or stiffness  RESPIRATORY: No cough; No shortness of breath  CARDIOVASCULAR: No chest pain, no palpitations  GASTROINTESTINAL: No pain. No nausea or vomiting; No diarrhea   NEUROLOGICAL: No headache or numbness, no tremors  MUSCULOSKELETAL: No joint pain, no muscle pain  GENITOURINARY: no dysuria, no frequency, no hesitancy  PSYCHIATRY: no depression , no anxiety  ALL OTHER  ROS negative        Vital Signs Last 24 Hrs  T(C): 36.8 (13 May 2019 16:12), Max: 37.4 (13 May 2019 08:05)  T(F): 98.3 (13 May 2019 16:12), Max: 99.4 (13 May 2019 08:05)  HR: 70 (13 May 2019 16:12) (65 - 90)  BP: 98/59 (13 May 2019 16:12) (86/57 - 135/95)  BP(mean): --  RR: 18 (13 May 2019 16:12) (16 - 19)  SpO2: 100% (13 May 2019 16:12) (95% - 100%)    ________________________________________________  PHYSICAL EXAM:  GENERAL: NAD, pale   HEENT: Normocephalic;  conjunctivae and sclerae clear; moist mucous membranes;   NECK : supple  CHEST/LUNG: Clear to auscultation bilaterally with good air entry   HEART: S1 S2  regular; no murmurs, gallops or rubs  ABDOMEN: Soft, Nontender, Nondistended; Bowel sounds present  EXTREMITIES: no cyanosis; + mild  edema; no calf tenderness  SKIN: warm and dry; no rash  NERVOUS SYSTEM:  Awake and alert; Oriented  to place, person and time ; no new deficits    _________________________________________________  LABS:                        7.8    7.84  )-----------( 211      ( 13 May 2019 06:27 )             25.0     05-13    144  |  115<H>  |  28<H>  ----------------------------<  94  4.5   |  23  |  1.25    Ca    7.8<L>      13 May 2019 06:27  Phos  2.6     05-13  Mg     1.8     05-13    TPro  6.8  /  Alb  2.9<L>  /  TBili  0.4  /  DBili  x   /  AST  13  /  ALT  16  /  AlkPhos  62  05-12      Urinalysis Basic - ( 13 May 2019 11:08 )    Color: Yellow / Appearance: Clear / S.015 / pH: x  Gluc: x / Ketone: Negative  / Bili: Negative / Urobili: Negative   Blood: x / Protein: 30 mg/dL / Nitrite: Positive   Leuk Esterase: Moderate / RBC: 10-25 /HPF / WBC >50 /HPF   Sq Epi: x / Non Sq Epi: Moderate /HPF / Bacteria: Many /HPF      CAPILLARY BLOOD GLUCOSE            RADIOLOGY & ADDITIONAL TESTS:    Imaging Personally Reviewed:  YES/NO    Consultant(s) Notes Reviewed:   YES/ No    Care Discussed with Consultants :     Plan of care was discussed with patient and /or primary care giver; all questions and concerns were addressed and care was aligned with patient's wishes.

## 2019-05-13 NOTE — CONSULT NOTE ADULT - SUBJECTIVE AND OBJECTIVE BOX
CHIEF COMPLAINT:Patient is a 73y old  Female who presents with a chief complaint of SOB,weakness.    HPI:  72 y/o F Armenian speaking from home, walks with cane, no HHA ,lives with daughter with PMHx of HTN, HLD, CAD (1 stent in 2014), and arthritis presents to the ED with c/o  generalized weakness and pale skin x 1 week.History obtained from the daughter at bedside as pt didnt wanted the  services. Daughter states that pt went for blood test with her PMD 1 week ago who told her that Hb is 9.5.Also since 1 week pt is easily getting fatigued, SOB on ambulation and is not waking as much as used to and her appetite has decreased. Pt also reports mild chest discomfort since morning which has resolved now .  Pt denies any lightheadedness, nausea, vomiting,fever , Cough, dark stools or other complaints.Pt never had any colonoscopy done . She had EGD done 4 yrs ago which showed gastritis and some hiatal hernia. (12 May 2019 17:40)      PAST MEDICAL & SURGICAL HISTORY:  CAD (coronary artery disease)  HLD (hyperlipidemia)  S/P cardiac catheterization: stent x 1  History of hypertension  Arthritis      MEDICATIONS  (STANDING):  aspirin  chewable 81 milliGRAM(s) Oral daily  atorvastatin 40 milliGRAM(s) Oral at bedtime  heparin  Injectable 5000 Unit(s) SubCutaneous every 8 hours  metoprolol tartrate 25 milliGRAM(s) Oral two times a day  oxybutynin 10 milliGRAM(s) Oral two times a day  pantoprazole    Tablet 40 milliGRAM(s) Oral before breakfast  sodium chloride 0.9%. 1000 milliLiter(s) (75 mL/Hr) IV Continuous <Continuous>    MEDICATIONS  (PRN):  acetaminophen 300 mG/codeine 30 mG 1 Tablet(s) Oral every 6 hours PRN Moderate Pain (4 - 6)      FAMILY HISTORY:  No pertinent family history in first degree relatives      SOCIAL HISTORY:    [ x] Non-smoker    [x ] Alcohol    Allergies    No Known Allergies    Intolerances    	    REVIEW OF SYSTEMS:  CONSTITUTIONAL: No fever, weight loss, or fatigue  EYES: No eye pain, visual disturbances, or discharge  ENT:  No difficulty hearing, tinnitus, vertigo; No sinus or throat pain  NECK: No pain or stiffness  RESPIRATORY: No cough, wheezing, chills or hemoptysis; + Shortness of Breath  CARDIOVASCULAR: No chest pain, palpitations, passing out, dizziness, or leg swelling  GASTROINTESTINAL: No abdominal or epigastric pain. No nausea, vomiting, or hematemesis; No diarrhea or constipation. No melena or hematochezia.  GENITOURINARY: No dysuria, frequency, hematuria, or incontinence  NEUROLOGICAL: No headaches, memory loss, loss of strength, numbness, or tremors  SKIN: No itching, burning, rashes, or lesions   LYMPH Nodes: No enlarged glands  ENDOCRINE: No heat or cold intolerance; No hair loss  MUSCULOSKELETAL: No joint pain or swelling; No muscle, back, or extremity pain  PSYCHIATRIC: No depression, anxiety, mood swings, or difficulty sleeping  HEME/LYMPH: No easy bruising, or bleeding gums  ALLERGY AND IMMUNOLOGIC: No hives or eczema	    PHYSICAL EXAM:  T(C): 36.8 (05-13-19 @ 16:12), Max: 37.4 (05-13-19 @ 08:05)  HR: 70 (05-13-19 @ 16:12) (65 - 90)  BP: 98/59 (05-13-19 @ 16:12) (86/57 - 135/95)  RR: 18 (05-13-19 @ 16:12) (16 - 19)  SpO2: 100% (05-13-19 @ 16:12) (95% - 100%)  Wt(kg): --  I&O's Summary      Appearance: Normal	  HEENT:   Normal oral mucosa, PERRL, EOMI	  Lymphatic: No lymphadenopathy  Cardiovascular: Normal S1 S2, No JVD, No murmurs, No edema  Respiratory: Lungs clear to auscultation	  Psychiatry: A & O x 3, Mood & affect appropriate  Gastrointestinal:  Soft, Non-tender, + BS	  Skin: No rashes, No ecchymoses, No cyanosis	  Neurologic: Non-focal  Extremities: Normal range of motion, No clubbing, cyanosis or edema  Vascular: Peripheral pulses palpable 2+ bilaterally    	    ECG:  	Normal sinus rhythm  Low voltage QRS  Borderline ECG      	  	  LABS:	 	    CARDIAC MARKERS:  CARDIAC MARKERS ( 12 May 2019 15:40 )  <0.015 ng/mL / x     / x     / x     / x                             7.8    7.84  )-----------( 211      ( 13 May 2019 06:27 )             25.0     05-13    144  |  115<H>  |  28<H>  ----------------------------<  94  4.5   |  23  |  1.25    Ca    7.8<L>      13 May 2019 06:27  Phos  2.6     05-13  Mg     1.8     05-13    TPro  6.8  /  Alb  2.9<L>  /  TBili  0.4  /  DBili  x   /  AST  13  /  ALT  16  /  AlkPhos  62  05-12      Lipid Profile: Cholesterol 103  LDL 38  HDL 34      HgA1c: Hemoglobin A1C, Whole Blood: 6.2 % (05-13 @ 11:26)    TSH: Thyroid Stimulating Hormone, Serum: 0.24 uU/mL (05-13 @ 06:27)      Occult Blood, Feces (05.13.19 @ 15:11)    Occult Blood, Feces: Positive
[  ] STAT REQUEST              [ X ] ROUTINE REQUEST    Patient is a 73 year old female with anemia and GI bleeding. GI consulted to evaluate.         HPI:  73 year old female with multiple medical problems presented with symptomatic anemia associated with positive occult blood in stool. Patient c/o constipation and intermittent hematochezia with bowel movement. Patient denies abdominal pain, nausea, vomiting, hematemesis, melenba, fever, chills, chest pain, SOB, cough, palpitation, cough, hematuria, hemoptysis, epistaxis, dysuria or diarrhea.        PAIN MANAGEMENT:  Pain Scale:                 /10  Pain Location:      Prior Colonoscopy:  No prior colonoscopy    PAST MEDICAL HISTORY  CAD    Hyperlipidemia   HTN  Arthritis      PAST SURGICAL HISTORY  No significant past surgical history      Allergies    No Known Allergies          MEDICATIONS  (STANDING):  aspirin  chewable 81 milliGRAM(s) Oral daily  atorvastatin 40 milliGRAM(s) Oral at bedtime  heparin  Injectable 5000 Unit(s) SubCutaneous every 8 hours  metoprolol tartrate 25 milliGRAM(s) Oral two times a day  oxybutynin 10 milliGRAM(s) Oral two times a day  pantoprazole    Tablet 40 milliGRAM(s) Oral before breakfast  sodium chloride 0.9%. 1000 milliLiter(s) (75 mL/Hr) IV Continuous <Continuous>    MEDICATIONS  (PRN):  acetaminophen 300 mG/codeine 30 mG 1 Tablet(s) Oral every 6 hours PRN Moderate Pain (4 - 6)      SOCIAL HISTORY  Advanced Directives:       [ X ] Full Code       [  ] DNR  Marital Status:         [  ] M      [ X ] S      [  ] D       [  ] W  Children:       [ X ] Yes      [  ] No  Occupation:        [  ] Employed       [ X ] Unemployed       [  ] Retired  Diet:       [ X ] Regular       [  ] PEG feeding          [  ] NG tube feeding  Drug Use:           [ X ] Patient denied          [  ] Yes  Alcohol:           [ X ] No             [  ] Yes (socially)         [  ] Yes (chronic)  Tobacco:           [  ] Yes           [ X ] No    FAMILY HISTORY  [ X ] Heart Disease            [  ] Diabetes             [  ] HTN             [  ] Colon Cancer             [  ] Stomach Cancer              [  ] Pancreatic Cancer    VITAL SIGNS   Vital Signs Last 24 Hrs  T(C): 36.8 (13 May 2019 16:12), Max: 37.4 (13 May 2019 08:05)  T(F): 98.3 (13 May 2019 16:12), Max: 99.4 (13 May 2019 08:05)  HR: 70 (13 May 2019 16:12) (65 - 90)  BP: 98/59 (13 May 2019 16:12) (86/57 - 135/95)   RR: 18 (13 May 2019 16:12) (16 - 19)  SpO2: 100% (13 May 2019 16:12) (95% - 100%)        CBC Full  -  ( 13 May 2019 06:27 )  WBC Count : 7.84 K/uL  RBC Count : 2.66 M/uL  Hemoglobin : 7.8 g/dL  Hematocrit : 25.0 %  Platelet Count - Automated : 211 K/uL  Mean Cell Volume : 94.0 fl  Mean Cell Hemoglobin : 29.3 pg  Mean Cell Hemoglobin Concentration : 31.2 gm/dL  Auto Neutrophil # : 4.80 K/uL  Auto Lymphocyte # : 2.07 K/uL  Auto Monocyte # : 0.85 K/uL  Auto Eosinophil # : 0.04 K/uL  Auto Basophil # : 0.03 K/uL  Auto Neutrophil % : 61.3 %  Auto Lymphocyte % : 26.4 %  Auto Monocyte % : 10.8 %  Auto Eosinophil % : 0.5 %  Auto Basophil % : 0.4 %      05-13    144  |  115<H>  |  28<H>  ----------------------------<  94  4.5   |  23  |  1.25    Ca    7.8<L>      13 May 2019 06:27  Phos  2.6     05-13  Mg     1.8     05-13    TPro  6.8  /  Alb  2.9<L>  /  TBili  0.4  /  DBili  x   /  AST  13  /  ALT  16  /  AlkPhos  62  05-12     Occult Blood, Feces (05.13.19 @ 15:11)    Occult Blood, Feces: Positive    Iron with Total Binding Capacity (05.12.19 @ 18:10)    % Saturation, Iron: 8 %    Iron - Total Binding Capacity.: 197 ug/dL    Iron Total, Serum: 16 ug/dL    Unsaturated Iron Binding Capacity: 181 ug/dL    Urinalysis (05.13.19 @ 11:08)    Glucose Qualitative, Urine: Negative    Blood, Urine: Large    pH Urine: 5.0    Color: Yellow    Urine Appearance: Clear    Bilirubin: Negative    Ketone - Urine: Negative    Specific Gravity: 1.015    Protein, Urine: 30 mg/dL    Urobilinogen: Negative    Nitrite: Positive    Leukocyte Esterase Concentration: Moderate    ECG  Ventricular Rate 74 BPM    Atrial Rate 74 BPM    P-R Interval 148 ms    QRS Duration 96 ms    Q-T Interval 386 ms    QTC Calculation(Bezet) 428 ms    P Axis 58 degrees    R Axis 9 degrees    T Axis 27 degrees    Diagnosis Line *** Poor data quality, interpretation may be adversely affected  Normal sinus rhythm  Low voltage QRS  Borderline ECG      RADIOLOGY/IMAGING                EXAM:  XR CHEST PORTABLE IMMED 1V                            PROCEDURE DATE:  05/12/2019          INTERPRETATION:  CLINICAL INDICATION: Chest pain.    COMPARISON: There are no similar prior studies available for comparison.     FINDINGS:  The lungsare clear. No sizable pleural effusion.   There is no evidence of pneumothorax.  Cardiac size cannot be evaluated in this projection.    IMPRESSION:  Clear lungs.

## 2019-05-14 NOTE — PROGRESS NOTE ADULT - SUBJECTIVE AND OBJECTIVE BOX
CHIEF COMPLAINT:Patient is a 73y old  Female who presents with a chief complaint of SOB .Pt appears comfortable.    	  REVIEW OF SYSTEMS:  CONSTITUTIONAL: No fever, weight loss, or fatigue  EYES: No eye pain, visual disturbances, or discharge  ENT:  No difficulty hearing, tinnitus, vertigo; No sinus or throat pain  NECK: No pain or stiffness  RESPIRATORY: No cough, wheezing, chills or hemoptysis; No Shortness of Breath  CARDIOVASCULAR: No chest pain, palpitations, passing out, dizziness, or leg swelling  GASTROINTESTINAL: No abdominal or epigastric pain. No nausea, vomiting, or hematemesis; No diarrhea or constipation. No melena or hematochezia.  GENITOURINARY: No dysuria, frequency, hematuria, or incontinence  NEUROLOGICAL: No headaches, memory loss, loss of strength, numbness, or tremors  SKIN: No itching, burning, rashes, or lesions   LYMPH Nodes: No enlarged glands  ENDOCRINE: No heat or cold intolerance; No hair loss  MUSCULOSKELETAL: No joint pain or swelling; No muscle, back, or extremity pain  PSYCHIATRIC: No depression, anxiety, mood swings, or difficulty sleeping  HEME/LYMPH: No easy bruising, or bleeding gums  ALLERGY AND IMMUNOLOGIC: No hives or eczema	      PHYSICAL EXAM:  T(C): 36.8 (05-14-19 @ 08:24), Max: 36.8 (05-13-19 @ 16:12)  HR: 66 (05-14-19 @ 08:24) (66 - 74)  BP: 103/51 (05-14-19 @ 08:24) (98/59 - 119/65)  RR: 18 (05-14-19 @ 08:24) (17 - 18)  SpO2: 98% (05-14-19 @ 08:24) (96% - 100%)  Wt(kg): --  I&O's Summary      Appearance: Normal	  HEENT:   Normal oral mucosa, PERRL, EOMI	  Lymphatic: No lymphadenopathy  Cardiovascular: Normal S1 S2, No JVD, No murmurs, No edema  Respiratory: Lungs clear to auscultation	  Psychiatry: A & O x 3, Mood & affect appropriate  Gastrointestinal:  Soft, Non-tender, + BS	  Skin: No rashes, No ecchymoses, No cyanosis	  Neurologic: Non-focal  Extremities: Normal range of motion, No clubbing, cyanosis or edema  Vascular: Peripheral pulses palpable 2+ bilaterally    MEDICATIONS  (STANDING):  aspirin  chewable 81 milliGRAM(s) Oral daily  atorvastatin 40 milliGRAM(s) Oral at bedtime  cefTRIAXone   IVPB      cholecalciferol 1000 Unit(s) Oral daily  cyanocobalamin 1000 MICROGram(s) Oral daily  heparin  Injectable 5000 Unit(s) SubCutaneous every 8 hours  iron sucrose IVPB 200 milliGRAM(s) IV Intermittent once  metoprolol tartrate 25 milliGRAM(s) Oral two times a day  oxybutynin 10 milliGRAM(s) Oral two times a day  pantoprazole    Tablet 40 milliGRAM(s) Oral before breakfast  sodium chloride 0.9%. 1000 milliLiter(s) (75 mL/Hr) IV Continuous <Continuous>        	  LABS:	 	    CARDIAC MARKERS:  CARDIAC MARKERS ( 12 May 2019 15:40 )  <0.015 ng/mL / x     / x     / x     / x                            8.5    5.13  )-----------( 307      ( 14 May 2019 10:29 )             27.5     05-14    143  |  112<H>  |  16  ----------------------------<  142<H>  4.1   |  24  |  1.04    Ca    8.4      14 May 2019 10:29  Phos  2.6     05-13  Mg     1.8     05-13    TPro  6.8  /  Alb  2.9<L>  /  TBili  0.4  /  DBili  x   /  AST  13  /  ALT  16  /  AlkPhos  62  05-12      Lipid Profile: Cholesterol 103  LDL 38  HDL 34      HgA1c: Hemoglobin A1C, Whole Blood: 6.2 % (05-13 @ 11:26)    TSH: Thyroid Stimulating Hormone, Serum: 0.24 uU/mL (05-13 @ 06:27)      	  EXAM:  CT ABDOMEN AND PELVIS OC                            PROCEDURE DATE:  05/14/2019          INTERPRETATION:  CLINICAL HISTORY: 73 years  Female with anemia  anemia.    COMPARISON: None.    PROCEDURE:   CT of the Abdomen and Pelvis was performedwithout intravenous contrast.   Intravenous contrast: None.  Oral contrast: positive contrast was administered.  Sagittal and coronal reformats were performed.    LIMITATIONS: Evaluation of the solid organs is limited by lack of IV   contrast.    FINDINGS:    LOWER CHEST: Mild bibasilar dependent changes. Cardiomegaly. Coronary   artery calcifications.    LIVER: Within normal limits.  BILE DUCTS: Normal caliber.  GALLBLADDER: Not visualized  SPLEEN: Within normal limits. 1.5 cm splenule near the splenic tip.  PANCREAS: Atrophic pancreatic head, neck and body. Limited evaluation for   pancreatic tail mass.  ADRENALS: Within normal limits.  KIDNEYS/URETERS: 1.8 cm right upper pole renal cysts. 5 mm right upper   pole renal cyst to 6 lesion too small to characterize with certainty. 2.4   cm right midpole cyst. Right renal cortical atrophy. Mild nonspecific   bilateral perinephric inflammatory stranding. Infiltration of the left   renal hilar fat.    BLADDER: Within normal limits.  REPRODUCTIVE ORGANS: Unremarkable uterus.    BOWEL: No bowel obstruction. Descending and sigmoid colonic   diverticulosis without diverticulitis. Transverse colon within the   ventral hernia. At least 3 duodenal diverticula are present in the second   portion measuring up to 1.5 cm in diameter. Mild stranding of the   periduodenal fat. Cannot rule out ulcer. The appendix is not visualized   and cannot be assessed.The stomach is collapsed and cannot be assessed.  PERITONEUM: No ascites. No pneumoperitoneum.  VESSELS:  Within normal limits.  RETROPERITONEUM: No lymphadenopathy.    ABDOMINAL WALL: 9.7 cm supraumbilical hernia containing transverse colon.   No bowel obstruction.  BONES: Diffuse osteoporosis. Thoracic degenerative changes with mild   compression deformity of T12.    IMPRESSION:    At least 3 duodenal diverticula. Mild stranding of the paraduodenal fat.   Recommend endoscopy to exclude duodenal ulcer. The stomach is collapsed   and cannot be assessed.    Nonspecific bilateral perinephric inflammatory stranding, worse on the   left. Infiltration of the left neural hilar fat. Rule out pyelonephritis.   Consider CT urogram for additional diagnostic benefit.    9.7 cm supraumbilical ventral hernia containing transverse colon. No   bowel obstruction.    Fullness of the pancreatic tail may be related to disproportionate   atrophy of the remainder the pancreas. Cannot rule out underlying mass   without IV contrast.    Descending and sigmoid colonic diverticulosis without diverticulitis.    Cardiomegaly. Coronary artery disease.    Findings discussed with Dr. Tao at 5/14/2019 12:58 PM with readback.            Iron with Total Binding Capacity (05.13.19 @ 18:07)    Iron - Total Binding Capacity.: 207 ug/dL    % Saturation, Iron: 14 %    Iron Total, Serum: 29 ug/dL    Unsaturated Iron Binding Capacity: 178 ug/dL    Vitamin B12, Serum: 461 pg/mL (05.14.19 @ 01:31)    Vitamin D, 25-Hydroxy: 23.7: 30 - 80 ng/mL                                        Optimum Levels  (Reference range)  > 80 ng/mL                                             Toxicity possible  20 - 29 ng/mL                                         Insufficiency  10 - 19 ng/mL                                 Mild to Moderate  Deficiency  < 10 ng/mL                                             Severe Deficiency  Optimum levels for 25-Hydroxy vitamin D are 30 ng/mL and above based on  the Endocrine Society guidelines 2011. However, there is a lack of  consensus on this and the [place] [employer] recommends 20 ng/mL and  above as optimum levels. Vitamin D results may vary depending on the  method of analysis. The current "[employer] XL chemiluminescent immunoassay  methodmeasures both D2 and D3 and was introduced in 2010. ng/mL (05.14.19 @ 03:43)

## 2019-05-14 NOTE — DIETITIAN INITIAL EVALUATION ADULT. - PROBLEM SELECTOR PLAN 3
pt takes losartan and loppresor at home   - BP running on low side   - will hold ACEi in setting of JENI   - c/w lopressor for now with parameters  - monitor BP , will add CCB if BP not controlled   - f/u TTE

## 2019-05-14 NOTE — PROGRESS NOTE ADULT - SUBJECTIVE AND OBJECTIVE BOX
Patient is a 73y old  Female who presents with a chief complaint of SOB (13 May 2019 16:47)      INTERVAL HPI/OVERNIGHT EVENTS: Pt offers no new complaints . Hb stable at 8/5 s/p 1 RBC.   UA +ve ,will start on rocephin   A1c 6.2     MEDICATIONS  (STANDING):  aspirin  chewable 81 milliGRAM(s) Oral daily  atorvastatin 40 milliGRAM(s) Oral at bedtime  cefTRIAXone   IVPB      cholecalciferol 1000 Unit(s) Oral daily  cyanocobalamin 1000 MICROGram(s) Oral daily  heparin  Injectable 5000 Unit(s) SubCutaneous every 8 hours  metoprolol tartrate 25 milliGRAM(s) Oral two times a day  oxybutynin 10 milliGRAM(s) Oral two times a day  pantoprazole    Tablet 40 milliGRAM(s) Oral before breakfast  sodium chloride 0.9%. 1000 milliLiter(s) (75 mL/Hr) IV Continuous <Continuous>    MEDICATIONS  (PRN):  acetaminophen 300 mG/codeine 30 mG 1 Tablet(s) Oral every 6 hours PRN Moderate Pain (4 - 6)      Allergies    No Known Allergies    Intolerances        REVIEW OF SYSTEMS:  CONSTITUTIONAL: No fever, weight loss, or fatigue  RESPIRATORY: No cough, wheezing, chills or hemoptysis; No shortness of breath  CARDIOVASCULAR: No chest pain, palpitations, dizziness, or leg swelling  GASTROINTESTINAL: No abdominal or epigastric pain. No nausea, vomiting, or hematemesis; No diarrhea or constipation. No melena or hematochezia.  NEUROLOGICAL: No headaches, memory loss, loss of strength, numbness, or tremors  SKIN: No itching, burning, rashes, or lesions     Vital Signs Last 24 Hrs  T(C): 36.8 (14 May 2019 08:24), Max: 36.8 (13 May 2019 16:12)  T(F): 98.3 (14 May 2019 08:24), Max: 98.3 (13 May 2019 16:12)  HR: 66 (14 May 2019 08:24) (66 - 74)  BP: 103/51 (14 May 2019 08:24) (98/59 - 119/65)  BP(mean): --  RR: 18 (14 May 2019 08:24) (17 - 18)  SpO2: 98% (14 May 2019 08:24) (96% - 100%)    PHYSICAL EXAM:  GENERAL: NAD,  HEAD:  Atraumatic, Normocephalic  EYES: EOMI, PERRLA, conjunctiva and sclera clear  NECK: Supple, No JVD, Normal thyroid  CHEST/LUNG: Clear to percussion bilaterally; No rales, rhonchi, wheezing, or rubs  HEART: Regular rate and rhythm; No murmurs, rubs, or gallops  ABDOMEN: Soft, Nontender, Nondistended; Bowel sounds present  NERVOUS SYSTEM:  Alert & Oriented X3, Good concentration; Motor Strength 5/5 B/L   EXTREMITIES:  2+ Peripheral Pulses, No clubbing, cyanosis, or edema  SKIN;    LABS:                        8.5    5.13  )-----------( 307      ( 14 May 2019 10:29 )             27.5     05-14    143  |  112<H>  |  16  ----------------------------<  142<H>  4.1   |  24  |  1.04    Ca    8.4      14 May 2019 10:29  Phos  2.6     05-13  Mg     1.8     05-13    TPro  6.8  /  Alb  2.9<L>  /  TBili  0.4  /  DBili  x   /  AST  13  /  ALT  16  /  AlkPhos  62  05-12      Urinalysis Basic - ( 13 May 2019 11:08 )    Color: Yellow / Appearance: Clear / S.015 / pH: x  Gluc: x / Ketone: Negative  / Bili: Negative / Urobili: Negative   Blood: x / Protein: 30 mg/dL / Nitrite: Positive   Leuk Esterase: Moderate / RBC: 10-25 /HPF / WBC >50 /HPF   Sq Epi: x / Non Sq Epi: Moderate /HPF / Bacteria: Many /HPF      CAPILLARY BLOOD GLUCOSE          RADIOLOGY & ADDITIONAL TESTS:

## 2019-05-14 NOTE — DIETITIAN INITIAL EVALUATION ADULT. - PROBLEM SELECTOR PLAN 1
p/w weakness, pale skin, loss of appetite, chest discomfort, h/o CAD   - Hb 1 week ago with PMD 9.5, now Hb 7.5   - pt denies any dark stools, active bleeding  - D/d :combination of  chronic blood loss ,ACD ( h/o arthritis) and nutritional def   - Iron studies : Fe low, TIBC low , % sat low  - FOBT -ve   - pt never had colonoscopy , EGD 4 yrs ago showed gastritis only, pt also on dual antiplatelet therapy   - will repeat FOBT   - will transfuse 1 unit as she has h/o CAD , goal >8  -  Protonix 40 mg daily PO   - f/u TTE , PT eval , Vit B 12 , folate , Vit D and TSH   - f/u CBC daily   - GI consult Dr Argueta   - Nutritional consult for poor appetite

## 2019-05-14 NOTE — PROGRESS NOTE ADULT - PROBLEM SELECTOR PLAN 4
BP wnl   - hold ACEi in setting of JENI   - c/w lopressor for now with parameters  - monitor BP   - f/u TTE.

## 2019-05-14 NOTE — DIETITIAN INITIAL EVALUATION ADULT. - OTHER INFO
Pt visited. Pt seen for LOS= 7 days. Pt is Cymraes speaking. D/W Pts Daughter ( ashley) via Phone. Per  daughter Appetite is fair x ~ approx 2 weeks.  Prior to that appetite was Good. NKFA reported. Family brings ethenic food from Home.  Pt is  also on Ensure Enlive but does not  drink much.

## 2019-05-14 NOTE — PROGRESS NOTE ADULT - ASSESSMENT
74 y/o F [place] speaking from home, walks with cane, no HHA ,lives with daughter with PMHx of HTN, HLD, CAD (1 stent in 2014), and arthritis presents to the ED with c/o  generalized weakness and pale skin x 1 ,symptomatic anemia,Occult+.  1.Echocardiogram  2.GI eval noted-egd and colonoscopy.  3.Prediabetes-diet.  4.CAD-asa,b blocker,statin.  5.Check anemia profile,cea.  6.HTN-b blocker.  7.Anemia-Iron.

## 2019-05-14 NOTE — PROGRESS NOTE ADULT - ASSESSMENT
1. Anemia  2. Positive occult blood in stool  3. No evidence of acute GI bleeding    Suggestions:    1. Monitor H/H closely  2. Avoid NSAID  3. EGD and colonoscopy  4. Check CEA  5. DVT prophylaxis  6. Protonix daily

## 2019-05-14 NOTE — PROGRESS NOTE ADULT - SUBJECTIVE AND OBJECTIVE BOX
[   ] ICU                                          [   ] CCU                                      [  X ] Medical Floor      Patient is comfortable. No new complaints reported, No abdominal pain, N/V, hematemesis, hematochezia, melena, fever, chills, chest pain, SOB, cough or diarrhea reported.      VITALS  Vital Signs Last 24 Hrs  T(C): 36.6 (14 May 2019 15:55), Max: 36.8 (14 May 2019 08:24)  T(F): 97.9 (14 May 2019 15:55), Max: 98.3 (14 May 2019 08:24)  HR: 62 (14 May 2019 15:55) (62 - 80)  BP: 116/51 (14 May 2019 15:55) (103/51 - 119/68)   RR: 18 (14 May 2019 15:55) (17 - 18)  SpO2: 96% (14 May 2019 15:55) (96% - 98%)         MEDICATIONS  (STANDING):  aspirin  chewable 81 milliGRAM(s) Oral daily  atorvastatin 40 milliGRAM(s) Oral at bedtime  bisacodyl 20 milliGRAM(s) Oral once  cefTRIAXone   IVPB      cyanocobalamin 1000 MICROGram(s) Oral daily  dextrose 5% + sodium chloride 0.9%. 1000 milliLiter(s) (60 mL/Hr) IV Continuous <Continuous>  ergocalciferol 66451 Unit(s) Oral <User Schedule>  heparin  Injectable 5000 Unit(s) SubCutaneous every 8 hours  iron sucrose IVPB 200 milliGRAM(s) IV Intermittent once  metoprolol tartrate 25 milliGRAM(s) Oral two times a day  oxybutynin 10 milliGRAM(s) Oral two times a day  pantoprazole    Tablet 40 milliGRAM(s) Oral before breakfast  polyethylene glycol/electrolyte Solution. 4000 milliLiter(s) Oral once    MEDICATIONS  (PRN):  acetaminophen 300 mG/codeine 30 mG 1 Tablet(s) Oral every 6 hours PRN Moderate Pain (4 - 6)                            8.5    5.13  )-----------( 307      ( 14 May 2019 10:29 )             27.5       05-14    143  |  112<H>  |  16  ----------------------------<  142<H>  4.1   |  24  |  1.04    Ca    8.4      14 May 2019 10:29  Phos  2.6     05-13  Mg     1.8     05-13

## 2019-05-14 NOTE — PROGRESS NOTE ADULT - PROBLEM SELECTOR PLAN 1
Hb 7.5 on adm , stable at  8.5 s/p 1 PRBC   concerning for chronic blood loss ,ACD, nutritional deficit   repeat FOBT positive   CEA -ve   -monitor CBC, transfuse as needed   - c/w PPI daily   - pt needs EGD and colonoscopy   -f/u abd/pelvis CT scan   - f/u TTE   - GI consult Dr Argueta   - Nutritional consult   - cardiology consult Dr milian

## 2019-05-15 NOTE — DISCHARGE NOTE PROVIDER - NSDCCPCAREPLAN_GEN_ALL_CORE_FT
PRINCIPAL DISCHARGE DIAGNOSIS  Diagnosis: Symptomatic anemia  Assessment and Plan of Treatment: You came with weakness and low hemoglobin. You were transfused 1 unit of blood . Your hemoglobin has been stable at 8.5 . CT scan of the abdomen didnt showed any suspicion of cancer.   Endoscopy noted for   Colonoscopy s        SECONDARY DISCHARGE DIAGNOSES  Diagnosis: UTI (urinary tract infection), bacterial  Assessment and Plan of Treatment: You were diagnosed with UTI. You were treated with IV antibiotics   Complete the antibiotic course   Follow up with your Primary Care Doctor in 1-2 weeks.    Diagnosis: JENI (acute kidney injury)  Assessment and Plan of Treatment: You were diagnosed with JENI likely from dehydration. It has resolved now .   Keep yourself hydrated .  Follow up with your Primary Care Doctor in 1-2 weeks for repeat BMP    Diagnosis: CAD (coronary artery disease)  Assessment and Plan of Treatment: You have history of CAD   keep taking the medications as prescribed .  Your Plavix was held while in hospital due to concern of bleeding   Echocardiogram of the heart was normal   Follow up with your Primary Care Doctor in 1-2 weeks.    Diagnosis: HTN (hypertension)  Assessment and Plan of Treatment: Continue taking your home medications . Your losartan was held in the hospital due to worsening kidney function.   Will resume on discharge   Follow up with your Primary Care Doctor in 1-2 weeks. PRINCIPAL DISCHARGE DIAGNOSIS  Diagnosis: Symptomatic anemia  Assessment and Plan of Treatment: You came with weakness and low hemoglobin. You were transfused 1 unit of blood . Your hemoglobin has been stable at 8.5 . CT scan of the abdomen didnt showed any suspicion of cancer.   Endoscopy showed esophagitits ,gastritis and haital hernia. Biopsy was taken  Colonoscopy showed diverticulosis , internal hemorrhoids. Rectal biospy taken.   Continue taking Protonix daily and Eat high Fibre diet. Avoid constipation.   Follow up with Gastroenterologist Dr Argueta as outpatient in 1-2 weeks for Biopsy results and Capsule Endoscopy         SECONDARY DISCHARGE DIAGNOSES  Diagnosis: UTI (urinary tract infection), bacterial  Assessment and Plan of Treatment: You were diagnosed with UTI. You were treated with IV antibiotics   Complete the antibiotic course   Follow up with your Primary Care Doctor in 1-2 weeks.    Diagnosis: JENI (acute kidney injury)  Assessment and Plan of Treatment: You were diagnosed with JENI likely from dehydration. It has resolved now .   Keep yourself hydrated .  Follow up with your Primary Care Doctor in 1-2 weeks for repeat BMP    Diagnosis: CAD (coronary artery disease)  Assessment and Plan of Treatment: You have history of CAD   keep taking the medications as prescribed .  Your Plavix was held while in hospital due to concern of bleeding   Echocardiogram of the heart was normal   Follow up with your Primary Care Doctor in 1-2 weeks.    Diagnosis: HTN (hypertension)  Assessment and Plan of Treatment: Continue taking your home medications . Your losartan was held in the hospital due to worsening kidney function.   Will resume on discharge   Follow up with your Primary Care Doctor in 1-2 weeks.

## 2019-05-15 NOTE — DISCHARGE NOTE PROVIDER - CARE PROVIDER_API CALL
Kyrie Argueta)  Medicine  [address] [place] [address]  Phone: [phone]  Fax: [phone]  Follow Up Time:     Kristin Ramirez)  Internal Medicine  [address] [place] [address] [address]  [place] [address]  Phone: [phone]  Fax: [phone]  Follow Up Time:

## 2019-05-15 NOTE — DISCHARGE NOTE PROVIDER - HOSPITAL COURSE
74 y/o F Malaysian speaking from home, walks with cane, no HHA ,lives with daughter with PMHx of HTN, HLD, CAD (1 stent in 2014), and arthritis presents to the ED with c/o  generalized weakness and pale skin x 1 week        ED course :     Hb 7.5     FOBT -ve         iron studies : Fe low , TIBC low , % sat low     Cr 1.61    CXR clear         Pt will be admitted to med for symptomatic anemia and JENI     Anemia: Multifactorial chronic GI loss, nutritional and ACD .hb stable at 8.5 s/p 1 PRBC. CEA -ve . CT a/p : noted for duodenal diverticula and concern for L hydronephrosis . EGD shows      Colonoscopy shows     TTE : normal LV function         UTI: Ua +ve , UCx grew gram -ve rods ,started on Rocephin . US renal :         JENI : cr 1.61 , resolved after IVF         PT recc HPT         Pt stable for discharge as per attending 72 y/o F [place]ymra[place] speaking from home, walks with cane, no HHA ,lives with daughter with PMHx of HTN, HLD, CAD (1 stent in 2014), and arthritis presents to the ED with c/o  generalized weakness and pale skin x 1 week        ED course :     Hb 7.5     FOBT -ve         iron studies : Fe low , TIBC low , % sat low     Cr 1.61    CXR clear         Pt will be admitted to med for symptomatic anemia and JENI     Anemia: Multifactorial chronic GI loss, nutritional and ACD .hb stable at 8.5 s/p 1 PRBC. CEA -ve . CT a/p : noted for duodenal diverticula and concern for L hydronephrosis . EGD shows  Gastrtitis,esophagitis. Colonoscopy shows Diverticulosis , internal hemorrhoids , Rectal Bx taken to r/o proctitis     TTE : normal LV function         UTI: Ua +ve , UCx grew gram -ve rods ,started on Rocephin .         JENI : cr 1.61 , resolved after IVF         PT recc HPT         Pt stable for discharge as per attending

## 2019-05-15 NOTE — PROGRESS NOTE ADULT - PROBLEM SELECTOR PLAN 1
Hb 7.5 on adm , stable at  8.5 s/p 1 PRBC   concerning for chronic blood loss ,ACD, nutritional deficit   repeat FOBT positive   CEA -ve   CT a/p : 3 duodenal diverticula, Infiltration of the left neural hilar fat L kidney  TTE : normal LV function   -monitor CBC, transfuse as needed   - c/w PPI daily   - EGD and colonoscopy 5/15, Pt is npo , on gentle hydration   - GI consult Dr Argueta   - Nutritional consult   - cardiology consult Dr milian

## 2019-05-15 NOTE — PROGRESS NOTE ADULT - SUBJECTIVE AND OBJECTIVE BOX
Patient is a 73y old  Female who presents with a chief complaint of SOB (14 May 2019 14:08)      INTERVAL HPI/OVERNIGHT EVENTS: Pt NPO for EGD and colonoscopy . h/h stable     MEDICATIONS  (STANDING):  aspirin  chewable 81 milliGRAM(s) Oral daily  atorvastatin 40 milliGRAM(s) Oral at bedtime  cefTRIAXone   IVPB 1 Gram(s) IV Intermittent every 24 hours  cefTRIAXone   IVPB      cyanocobalamin 1000 MICROGram(s) Oral daily  dextrose 5% + sodium chloride 0.9%. 1000 milliLiter(s) (60 mL/Hr) IV Continuous <Continuous>  ergocalciferol 71221 Unit(s) Oral <User Schedule>  heparin  Injectable 5000 Unit(s) SubCutaneous every 8 hours  metoprolol tartrate 25 milliGRAM(s) Oral two times a day  oxybutynin 10 milliGRAM(s) Oral two times a day  pantoprazole    Tablet 40 milliGRAM(s) Oral before breakfast    MEDICATIONS  (PRN):  acetaminophen 300 mG/codeine 30 mG 1 Tablet(s) Oral every 6 hours PRN Moderate Pain (4 - 6)      Allergies    No Known Allergies    Intolerances        REVIEW OF SYSTEMS:  CONSTITUTIONAL: No fever, weight loss, or fatigue  RESPIRATORY: No cough, wheezing, chills or hemoptysis; No shortness of breath  CARDIOVASCULAR: No chest pain, palpitations, dizziness, or leg swelling  GASTROINTESTINAL: No abdominal or epigastric pain. No nausea, vomiting, or hematemesis; No diarrhea or constipation. No melena or hematochezia.  NEUROLOGICAL: No headaches, memory loss, loss of strength, numbness, or tremors  SKIN: No itching, burning, rashes, or lesions     Vital Signs Last 24 Hrs  T(C): 36.7 (15 May 2019 08:03), Max: 36.8 (14 May 2019 08:24)  T(F): 98 (15 May 2019 08:03), Max: 98.3 (14 May 2019 08:24)  HR: 65 (15 May 2019 08:03) (62 - 80)  BP: 128/56 (15 May 2019 08:03) (103/51 - 128/56)  BP(mean): --  RR: 17 (15 May 2019 08:03) (17 - 18)  SpO2: 98% (15 May 2019 08:03) (96% - 98%)    PHYSICAL EXAM:  GENERAL: NAD,  HEAD:  Atraumatic, Normocephalic  EYES: EOMI, PERRLA, conjunctiva and sclera clear  NECK: Supple, No JVD, Normal thyroid  CHEST/LUNG: Clear to percussion bilaterally; No rales, rhonchi, wheezing, or rubs  HEART: Regular rate and rhythm; No murmurs, rubs, or gallops  ABDOMEN: Soft, Nontender, Nondistended; Bowel sounds present  NERVOUS SYSTEM:  Alert & Oriented X3, Good concentration; Motor Strength4/5 B/L   EXTREMITIES:  2+ Peripheral Pulses, No clubbing, cyanosis, or edema  SKIN;    LABS:                        8.5    5.32  )-----------( 335      ( 15 May 2019 07:36 )             27.5     05-14    143  |  112<H>  |  16  ----------------------------<  142<H>  4.1   |  24  |  1.04    Ca    8.4      14 May 2019 10:29      PT/INR - ( 15 May 2019 07:36 )   PT: 11.9 sec;   INR: 1.07 ratio         PTT - ( 15 May 2019 07:36 )  PTT:31.4 sec  Urinalysis Basic - ( 13 May 2019 11:08 )    Color: Yellow / Appearance: Clear / S.015 / pH: x  Gluc: x / Ketone: Negative  / Bili: Negative / Urobili: Negative   Blood: x / Protein: 30 mg/dL / Nitrite: Positive   Leuk Esterase: Moderate / RBC: 10-25 /HPF / WBC >50 /HPF   Sq Epi: x / Non Sq Epi: Moderate /HPF / Bacteria: Many /HPF      CAPILLARY BLOOD GLUCOSE          RADIOLOGY & ADDITIONAL TESTS:    < from: CT Abdomen and Pelvis w/ Oral Cont (19 @ 12:11) >  IMPRESSION:    At least 3 duodenal diverticula. Mild stranding of the paraduodenal fat.   Recommend endoscopy to exclude duodenal ulcer. The stomach is collapsed   and cannot be assessed.    Nonspecific bilateral perinephric inflammatory stranding, worse on the   left. Infiltration of the left neural hilar fat. Rule out pyelonephritis.   Consider CT urogram for additional diagnostic benefit.    9.7 cm supraumbilical ventral hernia containing transverse colon. No   bowel obstruction.    Fullness of the pancreatic tail may be related to disproportionate   atrophy of the remainder the pancreas. Cannot rule out underlying mass   without IV contrast.    Descending and sigmoid colonic diverticulosis without diverticulitis.    Cardiomegaly. Coronary artery disease.    < end of copied text >

## 2019-05-15 NOTE — PROGRESS NOTE ADULT - SUBJECTIVE AND OBJECTIVE BOX
CHIEF COMPLAINT:Patient is a 73y old  Female who presents with a chief complaint of SOB.Pt appears comfortable.    	  REVIEW OF SYSTEMS:  CONSTITUTIONAL: No fever, weight loss, or fatigue  EYES: No eye pain, visual disturbances, or discharge  ENT:  No difficulty hearing, tinnitus, vertigo; No sinus or throat pain  NECK: No pain or stiffness  RESPIRATORY: No cough, wheezing, chills or hemoptysis; No Shortness of Breath  CARDIOVASCULAR: No chest pain, palpitations, passing out, dizziness, or leg swelling  GASTROINTESTINAL: No abdominal or epigastric pain. No nausea, vomiting, or hematemesis; No diarrhea or constipation. No melena or hematochezia.  GENITOURINARY: No dysuria, frequency, hematuria, or incontinence  NEUROLOGICAL: No headaches, memory loss, loss of strength, numbness, or tremors  SKIN: No itching, burning, rashes, or lesions   LYMPH Nodes: No enlarged glands  ENDOCRINE: No heat or cold intolerance; No hair loss  MUSCULOSKELETAL: No joint pain or swelling; No muscle, back, or extremity pain  PSYCHIATRIC: No depression, anxiety, mood swings, or difficulty sleeping  HEME/LYMPH: No easy bruising, or bleeding gums  ALLERGY AND IMMUNOLOGIC: No hives or eczema	      PHYSICAL EXAM:  T(C): 36.7 (05-15-19 @ 08:03), Max: 36.7 (05-15-19 @ 08:03)  HR: 65 (05-15-19 @ 08:03) (62 - 80)  BP: 128/56 (05-15-19 @ 08:03) (116/51 - 128/56)  RR: 17 (05-15-19 @ 08:03) (17 - 18)  SpO2: 98% (05-15-19 @ 08:03) (96% - 98%)        Appearance: Normal	  HEENT:   Normal oral mucosa, PERRL, EOMI	  Lymphatic: No lymphadenopathy  Cardiovascular: Normal S1 S2, No JVD, No murmurs, No edema  Respiratory: Lungs clear to auscultation	  Psychiatry: A & O x 3, Mood & affect appropriate  Gastrointestinal:  Soft, Non-tender, + BS	  Skin: No rashes, No ecchymoses, No cyanosis	  Neurologic: Non-focal  Extremities: Normal range of motion, No clubbing, cyanosis or edema  Vascular: Peripheral pulses palpable 2+ bilaterally    MEDICATIONS  (STANDING):  aspirin  chewable 81 milliGRAM(s) Oral daily  atorvastatin 40 milliGRAM(s) Oral at bedtime  cefTRIAXone   IVPB 1 Gram(s) IV Intermittent every 24 hours  cefTRIAXone   IVPB      cyanocobalamin 1000 MICROGram(s) Oral daily  dextrose 5% + sodium chloride 0.9%. 1000 milliLiter(s) (60 mL/Hr) IV Continuous <Continuous>  ergocalciferol 92722 Unit(s) Oral <User Schedule>  heparin  Injectable 5000 Unit(s) SubCutaneous every 8 hours  metoprolol tartrate 25 milliGRAM(s) Oral two times a day  oxybutynin 10 milliGRAM(s) Oral two times a day  pantoprazole    Tablet 40 milliGRAM(s) Oral before breakfast    	  LABS:	 	                       8.5    5.32  )-----------( 335      ( 15 May 2019 07:36 )             27.5     05-15    144  |  113<H>  |  13  ----------------------------<  97  4.4   |  26  |  0.88    Ca    8.6      15 May 2019 07:36        Lipid Profile: Cholesterol 103  LDL 38  HDL 34      HgA1c: Hemoglobin A1C, Whole Blood: 6.2 % (05-13 @ 11:26)    TSH: Thyroid Stimulating Hormone, Serum: 0.24 uU/mL (05-13 @ 06:27)      	  OBSERVATIONS:  Mitral Valve: Normal mitral valve. Mild mitral  regurgitation.  Aortic Root: Normal aortic root.  Aortic Valve: Normal trileaflet aortic valve. Trace aortic  regurgitation.  Left Atrium: Normal left atrium.  LA volume index = 24  cc/m2.  Left Ventricle: Normal left ventricular systolic function.  Normal left ventricular internal dimensions and wall  thicknesses.  Right Heart: Normal right atrium. Normal right ventricular  size and function. There is mild tricuspid regurgitation.  There is trace pulmonic regurgitation.  Pericardium/PleuraNormal pericardium with no pericardial  effusion.  Hemodynamic: RA Pressure is 8 mm Hg. Incomplete tricuspid  regurgitation jet precludes accurate assessment of  pulmonary artery systolic pressure.      Iron with Total Binding Capacity (05.13.19 @ 18:07)    % Saturation, Iron: 14 %    Iron - Total Binding Capacity.: 207 ug/dL    Iron Total, Serum: 29 ug/dL    Unsaturated Iron Binding Capacity: 178 ug/dL CHIEF COMPLAINT:Patient is a 73y old  Female who presents with a chief complaint of SOB.Pt appears comfortable.    	  REVIEW OF SYSTEMS:  CONSTITUTIONAL: No fever, weight loss, or fatigue  EYES: No eye pain, visual disturbances, or discharge  ENT:  No difficulty hearing, tinnitus, vertigo; No sinus or throat pain  NECK: No pain or stiffness  RESPIRATORY: No cough, wheezing, chills or hemoptysis; No Shortness of Breath  CARDIOVASCULAR: No chest pain, palpitations, passing out, dizziness, or leg swelling  GASTROINTESTINAL: No abdominal or epigastric pain. No nausea, vomiting, or hematemesis; No diarrhea or constipation. No melena or hematochezia.  GENITOURINARY: No dysuria, frequency, hematuria, or incontinence  NEUROLOGICAL: No headaches, memory loss, loss of strength, numbness, or tremors  SKIN: No itching, burning, rashes, or lesions   LYMPH Nodes: No enlarged glands  ENDOCRINE: No heat or cold intolerance; No hair loss  MUSCULOSKELETAL: No joint pain or swelling; No muscle, back, or extremity pain  PSYCHIATRIC: No depression, anxiety, mood swings, or difficulty sleeping  HEME/LYMPH: No easy bruising, or bleeding gums  ALLERGY AND IMMUNOLOGIC: No hives or eczema	      PHYSICAL EXAM:  T(C): 36.7 (05-15-19 @ 08:03), Max: 36.7 (05-15-19 @ 08:03)  HR: 65 (05-15-19 @ 08:03) (62 - 80)  BP: 128/56 (05-15-19 @ 08:03) (116/51 - 128/56)  RR: 17 (05-15-19 @ 08:03) (17 - 18)  SpO2: 98% (05-15-19 @ 08:03) (96% - 98%)        Appearance: Normal	  HEENT:   Normal oral mucosa, PERRL, EOMI	  Lymphatic: No lymphadenopathy  Cardiovascular: Normal S1 S2, No JVD, No murmurs, No edema  Respiratory: Lungs clear to auscultation	  Psychiatry: A & O x 3, Mood & affect appropriate  Gastrointestinal:  Soft, Non-tender, + BS	  Skin: No rashes, No ecchymoses, No cyanosis	  Neurologic: Non-focal  Extremities: Normal range of motion, No clubbing, cyanosis or edema  Vascular: Peripheral pulses palpable 2+ bilaterally    MEDICATIONS  (STANDING):  aspirin  chewable 81 milliGRAM(s) Oral daily  atorvastatin 40 milliGRAM(s) Oral at bedtime  cefTRIAXone   IVPB 1 Gram(s) IV Intermittent every 24 hours  cefTRIAXone   IVPB      cyanocobalamin 1000 MICROGram(s) Oral daily  dextrose 5% + sodium chloride 0.9%. 1000 milliLiter(s) (60 mL/Hr) IV Continuous <Continuous>  ergocalciferol 19195 Unit(s) Oral <User Schedule>  heparin  Injectable 5000 Unit(s) SubCutaneous every 8 hours  metoprolol tartrate 25 milliGRAM(s) Oral two times a day  oxybutynin 10 milliGRAM(s) Oral two times a day  pantoprazole    Tablet 40 milliGRAM(s) Oral before breakfast    	  LABS:	 	                       8.5    5.32  )-----------( 335      ( 15 May 2019 07:36 )             27.5     05-15    144  |  113<H>  |  13  ----------------------------<  97  4.4   |  26  |  0.88    Ca    8.6      15 May 2019 07:36        Lipid Profile: Cholesterol 103  LDL 38  HDL 34      HgA1c: Hemoglobin A1C, Whole Blood: 6.2 % (05-13 @ 11:26)    TSH: Thyroid Stimulating Hormone, Serum: 0.24 uU/mL (05-13 @ 06:27)      	  OBSERVATIONS:  Mitral Valve: Normal mitral valve. Mild mitral  regurgitation.  Aortic Root: Normal aortic root.  Aortic Valve: Normal trileaflet aortic valve. Trace aortic  regurgitation.  Left Atrium: Normal left atrium.  LA volume index = 24  cc/m2.  Left Ventricle: Normal left ventricular systolic function.  Normal left ventricular internal dimensions and wall  thicknesses.  Right Heart: Normal right atrium. Normal right ventricular  size and function. There is mild tricuspid regurgitation.  There is trace pulmonic regurgitation.  Pericardium/PleuraNormal pericardium with no pericardial  effusion.  Hemodynamic: RA Pressure is 8 mm Hg. Incomplete tricuspid  regurgitation jet precludes accurate assessment of  pulmonary artery systolic pressure.      Iron with Total Binding Capacity (05.13.19 @ 18:07)    % Saturation, Iron: 14 %    Iron - Total Binding Capacity.: 207 ug/dL    Iron Total, Serum: 29 ug/dL    Unsaturated Iron Binding Capacity: 178 ug/dL      Culture - Urine (05.14.19 @ 14:25)    Specimen Source: .Urine    Culture Results:   >100,000 CFU/ml Gram Negative Rods

## 2019-05-15 NOTE — PROGRESS NOTE ADULT - ASSESSMENT
72 y/o F Cypriot speaking from home, walks with cane, no HHA ,lives with daughter with PMHx of HTN, HLD, CAD (1 stent in 2014), and arthritis presents to the ED with c/o  generalized weakness and pale skin x 1 ,symptomatic anemia,Occult+.  1.Anemia-Iron.  2.GI eval noted-egd and colonoscopy.  3.Prediabetes-diet.  4.CAD-asa,b blocker,statin.  5.HTN-b blocker. 72 y/o F Yemeni speaking from home, walks with cane, no HHA ,lives with daughter with PMHx of HTN, HLD, CAD (1 stent in 2014), and arthritis presents to the ED with c/o  generalized weakness and pale skin x 1 ,symptomatic anemia,Occult+,UTI.  1.Anemia-Iron.  2.GI eval noted-egd and colonoscopy.  3.Prediabetes-diet.  4.CAD-asa,b blocker,statin.  5.HTN-b blocker.  6.UTI-ABX.

## 2019-05-15 NOTE — PROGRESS NOTE ADULT - ATTENDING COMMENTS
Patient is seen and examined. Case reviewed with the medical team. Above note is appreciated. Will follow up clinically. Continue DVT prophylaxis. Case discussed with NP. Follow up with [facility], Dr. Argueta.
Patient is seen and examined. Case reviewed with the medical team. Above note is appreciated. Will follow up clinically. Continue DVT prophylaxis. Case discussed with  GI. Await EGD and Colonoscopy.  \
Patient is seen and examined. Case reviewed with the medical team. Above note is appreciated. Will follow up clinically. Continue DVT prophylaxis. Case discussed with GI. Will follow up with EGD and colonoscopy.

## 2019-05-16 NOTE — DISCHARGE NOTE NURSING/CASE MANAGEMENT/SOCIAL WORK - NSDCDPATPORTLINK_GEN_ALL_CORE
You can access the [facility] Patient Portal, offered by [facility], by registering with the following website: [email or web address]

## 2019-10-24 PROBLEM — I25.10 ATHEROSCLEROTIC HEART DISEASE OF NATIVE CORONARY ARTERY WITHOUT ANGINA PECTORIS: Chronic | Status: ACTIVE | Noted: 2019-01-01

## 2019-10-24 PROBLEM — E78.5 HYPERLIPIDEMIA, UNSPECIFIED: Chronic | Status: ACTIVE | Noted: 2019-01-01

## 2019-10-24 NOTE — ED PROVIDER NOTE - CONSTITUTIONAL, MLM
normal... Pt is obese and wake, alert, oriented to person, place, time/situation and in no apparent distress.

## 2019-10-24 NOTE — ED ADULT NURSE NOTE - NSIMPLEMENTINTERV_GEN_ALL_ED
Implemented All Fall Risk Interventions:  Wever to call system. Call bell, personal items and telephone within reach. Instruct patient to call for assistance. Room bathroom lighting operational. Non-slip footwear when patient is off stretcher. Physically safe environment: no spills, clutter or unnecessary equipment. Stretcher in lowest position, wheels locked, appropriate side rails in place. Provide visual cue, wrist band, yellow gown, etc. Monitor gait and stability. Monitor for mental status changes and reorient to person, place, and time. Review medications for side effects contributing to fall risk. Reinforce activity limits and safety measures with patient and family.

## 2019-10-24 NOTE — ED ADULT NURSE NOTE - CHPI ED NUR SYMPTOMS NEG
no headache/no fever/no edema/no diaphoresis/no chills/no body aches/no cough/no chest pain/no hemoptysis

## 2019-10-24 NOTE — H&P ADULT - NSHPREVIEWOFSYSTEMS_GEN_ALL_CORE
REVIEW OF SYSTEMS:  CONSTITUTIONAL: No  fevers or chills  EYES/ENT: No visual changes;  No vertigo or throat pain   RESPIRATORY: No cough, wheezing, hemoptysis; No shortness of breath  CARDIOVASCULAR: No chest pain or palpitations  GASTROINTESTINAL: No abdominal  pain. No nausea, vomiting. No diarrhea or constipation. No melena or hematochezia.  GENITOURINARY: No dysuria, frequency or hematuria  NEUROLOGICAL: No numbness or weakness  SKIN: No itching, rashes

## 2019-10-24 NOTE — H&P ADULT - PROBLEM SELECTOR PLAN 1
p/w weakness, pale skin, SOB, h/o CAD   - Hb 8.1   - pt denies any dark stools, active bleeding  - D/d :combination of  chronic blood loss   - Last EGD 5/19 : gastritis and esophagitis   - Last colonoscopy 5/19 : Internal hemorrhoids   - Pt has been taking PPI on and off   -s/p 1 PRBC in ED  - will start on Protonix 40 IV BID   - f/u CBC post tranfusion   - GI consult Dr Argueta   - Pt jonatan p/w weakness, pale skin, SOB, h/o CAD   - Hb 8.1   - pt denies any dark stools, active bleeding  - D/d :combination of  chronic blood loss   - Last EGD 5/19 : gastritis and esophagitis   - Last colonoscopy 5/19 : Internal hemorrhoids   - Pt has been taking PPI on and off   -s/p 1 PRBC in ED  - will start on Protonix 40 IV BID   - f/u CBC post tranfusion   -f/u CXR and UA   - GI consult Dr Argueta   - Pt jonatan

## 2019-10-24 NOTE — H&P ADULT - ASSESSMENT
72 y/o F Jamaican speaking from home, walks with cane, no HHA ,lives with daughter with PMHx of HTN, HLD, CAD (1 stent in 2014), and arthritis presents to the ED with c/o worsening  generalized weakness x 1 day.    Pt will be admitted to tele for symptomatic anemia and elevated troponins

## 2019-10-24 NOTE — ED PROVIDER NOTE - OBJECTIVE STATEMENT
Pt is a 76y F with significant PMHx of arthritis, CAD, HTN (taking baby aspirin), HLD, cardiac cauterization and no significant PSHx of presenting to the ED c/o general weakness when sitting along with shortness of breath, dizziness and paleness. Pt had a transfusion in the past and was worked up for anemia of unknown cause. Pt denies any chest pain, no vomiting, no bloody stool/urine. Pt has NKDA and currently denies any additional complaints at this time.

## 2019-10-24 NOTE — PATIENT PROFILE ADULT - MEDICATIONS/VISITS
anesthesia. Past Surgical History:  has a past surgical history that includes Tonsillectomy; Myringotomy Tympanostomy Tube Placement; Middle ear surgery;  section; laparoscopy (2017); and pr lap,lysis of adhesions (N/A, 2018). Social History:  reports that she has never smoked. She has never used smokeless tobacco. She reports that she does not drink alcohol or use drugs. Family History: family history is not on file. The patients home medications have been reviewed. Allergies: Pcn [penicillins]    -------------------------------------------------- RESULTS -------------------------------------------------  Labs:  No results found for this visit on 19. Radiology:  No orders to display       ------------------------- NURSING NOTES AND VITALS REVIEWED ---------------------------  Date / Time Roomed:  2019  1:12 AM  ED Bed Assignment:      The nursing notes within the ED encounter and vital signs as below have been reviewed. /73   Pulse 100   Temp 98.6 °F (37 °C) (Oral)   Resp 20   Ht 5' 7\" (1.702 m)   Wt 188 lb (85.3 kg)   LMP 2019   BMI 29.44 kg/m²   Oxygen Saturation Interpretation: Normal      ------------------------------------------ PROGRESS NOTES ------------------------------------------  I have spoken with the patient and discussed todays results, in addition to providing specific details for the plan of care and counseling regarding the diagnosis and prognosis. Their questions are answered at this time and they are agreeable with the plan. I discussed at length with them reasons for immediate return here for re evaluation. They will followup with primary care by calling their office tomorrow. --------------------------------- ADDITIONAL PROVIDER NOTES ---------------------------------  At this time the patient is without objective evidence of an acute process requiring hospitalization or inpatient management.   They have
no

## 2019-10-24 NOTE — ED PROVIDER NOTE - CLINICAL SUMMARY MEDICAL DECISION MAKING FREE TEXT BOX
Pt is a 76y F presenting to the ED c/o general weakness when sitting along with shortness of breath, dizziness and paleness. Anemia likely causing her symptoms. Will likely need blood transfusion. Will order labs, +/- imaging. Will reassess.

## 2019-10-24 NOTE — H&P ADULT - PROBLEM SELECTOR PLAN 2
likely demand ischemia in the setting of anemia   - no chest pain ,ECG : NSR   - T1 0.438 T2 0.455 ,trend trop till peak   - last Echo 5/19 : 55% ef   - c/w aspirin , lopressor and statins   - cardio consult Dr sullivan

## 2019-10-24 NOTE — ED PROVIDER NOTE - PROGRESS NOTE DETAILS
Moran: trop 0.4 spoke with Dr Horvath and will see pt.  likely demand. PRBC pending Moran: trop elevated 0.4. feels better  admit to med tele for anemia and elevated trop possibly demand

## 2019-10-24 NOTE — H&P ADULT - NSHPPHYSICALEXAM_GEN_ALL_CORE
GENERAL: Obese   EYES: EOMI, PERRLA,   NECK: Supple, No JVD  CHEST/LUNG: Clear to auscultation b/l  No rales, rhonchi, wheezing   HEART: Regular rate and rhythm; No murmurs, +ve S1 S2   ABDOMEN: Soft, Nontender, Nondistended; Bowel sounds present  NERVOUS SYSTEM:  Alert & Oriented X3, normal sensations and normal strength     EXTREMITIES:   No clubbing, cyanosis, or edema  LYMPH NODES : non palpable   PSYCH: normal mood and affect

## 2019-10-24 NOTE — H&P ADULT - PROBLEM SELECTOR PLAN 4
Pt takes losartan 25 mg and lopressor 25 mg BID at home   - will continue with home medications with parameters   - Monitor BP and titrate meds accordingly

## 2019-10-24 NOTE — H&P ADULT - HISTORY OF PRESENT ILLNESS
74 y/o F Albanian speaking from home, walks with cane, no HHA ,lives with daughter with PMHx of HTN, HLD, CAD (1 stent in 2014), and arthritis presents to the ED with c/o worsening  generalized weakness x 1 day.  History obtained from the daughter at bedside as pt didnt wanted the  services. Since 1 week pt is easily getting fatigued, SOB on ambulation and is not waking as much as used to .Pt also reports mild chest discomfort on ambulation.Pt was last admitted with similar complaint in may2019 when she was found to have low Hb and was transfused 1 unit blood. EGD was done which showed   Gastrtitis,esophagitis. Colonoscopy showed Diverticulosis, internal hemorrhoids , Rectal Bx taken which came back normal. Pt denies any lightheadedness,CP  nausea, vomiting,fever , Cough, dark stools or other complaints.    ED course : Pt found to have Hb 8.1. Vitals stable . BUN/Cr 33/1.15 . ECG : NSR but elevated trop T1 0.438 T2 0.455 . ED contacted Dr Horvath who thinks elevated trops are from demand ischemia.     SH:  Denies Smoking, alcohol or drug use

## 2019-10-24 NOTE — H&P ADULT - PROBLEM SELECTOR PLAN 5
IMPROVE VTE Individual Risk Assessment  RISK                                                                Points  [  ] Previous VTE                                                  3  [  ] Thrombophilia                                               2  [  ] Lower limb paralysis                                      2  [  ] Current Cancer                                              2         (within 6 months)  [  ] Immobilization > 24 hrs                                1  [  ] ICU/CCU stay > 24 hours                              1  [  ] Age > 60                                                      1  IMPROVE VTE Score ___2______  DVT ppx :lovenox   GI ppx : PPI

## 2019-10-24 NOTE — ED ADULT NURSE NOTE - OBJECTIVE STATEMENT
pt came in for c/o sob & fatigue that began last night. pt endorses she was sitting in the chair and felt sob. Denies cp, cough, fever, chills, HA, dizziness. Pt placed on OH cardiac monitor & NC 2L O2. Breathing unlabored. pt appears pale on exam.

## 2019-10-25 NOTE — CONSULT NOTE ADULT - ASSESSMENT
72 y/o F Lebanese speaking from home, walks with cane, no HHA ,lives with daughter with PMHx of HTN, HLD, CAD (1 stent in 2014), and arthritis presents to the ED with c/o worsening  generalized weakness x 1 day, anemia, abnormal EKG.  1.Tele monitoring.  2.Heme eval-Spep,ldh and haptoglobin.  3.CAD-asa,statin,b blocker.  4.Anemia-s/p 1 PRBC.  5.PPI.

## 2019-10-25 NOTE — DISCHARGE NOTE PROVIDER - HOSPITAL COURSE
72 y/o F F[place]ian speaking from home, walks with cane, no HHA ,lives with daughter with PMHx of HTN, HLD, CAD (1 stent in 2014), and arthritis presents to the ED with c/o worsening  generalized weakness x 1 day.  History obtained from the daughter at bedside as pt didnt wanted the  services. Since 1 week pt is easily getting fatigued, SOB on ambulation and is not waking as much as used to .Pt also reports mild chest discomfort on ambulation.Pt was last admitted with similar complaint in may2019 when she was found to have low Hb and was transfused 1 unit blood. EGD was done which showed   Gastrtitis,esophagitis. Colonoscopy showed Diverticulosis, internal hemorrhoids , Rectal Bx taken which came back normal. Pt denies any lightheadedness,CP  nausea, vomiting,fever , Cough, dark stools or other complaints.        ED course : Pt found to have Hb 8.1. Vitals stable . BUN/Cr 33/1.15 . ECG : NSR but elevated trop T1 0.438 T2 0.455 . ED contacted Dr Horvath who thinks elevated trops are from demand ischemia.         SH:  Denies Smoking, alcohol or drug use         Anemia        Patient was found to have symptomatic anemia from an unknown source upon admission. Patient was found to have a hemoglobin of 8.1 and was transfused 1 unit of pRBCs in the ED. The patient corrected to 9.0 and then decreased to 8.7. Patient was worked up for the anemia and promptly started on IV Pantoprazole.     f/u Haptoglobin, LDH, FOBT, SPEP, and reccs from Dr. Castellanos (Heme/Onc)        UTI    On routine screening the patient was found to have a positive UA. As a result of that the patient had a UCx sent out that showed XXXXX. Patient was promptly started on Ceftriaxone 1 gram to be taken daily.     f/u UCx         JENI    On admission the patient was found to have an JENI with a BUN:Cr (33:1.15). This was promptly corrected with adequate oral hydration.         Elevated Troponin    Patient was found to have a brief increase in her troponins from .438, .455, .495, and down to .288. It was thought that the etiology of this was due to demand ischemia. 74 y/o F [place] citizen speaking from home, walks with cane, no HHA ,lives with daughter with PMHx of HTN, HLD, CAD (1 stent in 2014), and arthritis presents to the ED with c/o worsening  generalized weakness x 1 day.  History obtained from the daughter at bedside as pt didnt wanted the  services. Since 1 week pt is easily getting fatigued, SOB on ambulation and is not waking as much as used to .Pt also reports mild chest discomfort on ambulation.Pt was last admitted with similar complaint in may2019 when she was found to have low Hb and was transfused 1 unit blood. EGD was done which showed   Gastrtitis,esophagitis. Colonoscopy showed Diverticulosis, internal hemorrhoids , Rectal Bx taken which came back normal. Pt denies any lightheadedness,CP  nausea, vomiting,fever , Cough, dark stools or other complaints.        ED course : Pt found to have Hb 8.1. Vitals stable . BUN/Cr 33/1.15 . ECG : NSR but elevated trop T1 0.438 T2 0.455 . ED contacted Dr Horvath who thinks elevated trops are from demand ischemia.         SH:  Denies Smoking, alcohol or drug use         Anemia        Patient was found to have symptomatic anemia from an unknown source upon admission. Patient was found to have a hemoglobin of 8.1 and was transfused 1 unit of pRBCs in the ED. The patient corrected to 9.0 and then decreased to 8.7. Patient was worked up for the anemia and promptly started on IV Pantoprazole. Etiology of the Anemia was presumed to be due to MDS or Multiple Myeloma on the basis of an increased kappa lamda light chain ratio. It was decided further investigation of this was to be performed outpatient. This could include bone marrow biopsy and other blood tests. Patient's family and patient agreed.         UTI    On routine screening the patient was found to have a positive UA. As a result of that the patient had a UCx sent out that showed normal urogenital dipika. Patient was promptly started on Ceftriaxone 1 gram to be taken daily. Patient was discharged on Ceftin 250 mg BID for an additional 4 days to complete the course of treatment.     f/u UCx         JENI    On admission the patient was found to have an JENI with a BUN:Cr (33:1.15). This was promptly corrected with adequate oral hydration.         Elevated Troponin    Patient was found to have a brief increase in her troponins from .438, .455, .495, and down to .288. It was thought that the etiology of this was due to demand ischemia.         Patient was discharged home hemodynamically stable and fit for home with home services.

## 2019-10-25 NOTE — PROGRESS NOTE ADULT - SUBJECTIVE AND OBJECTIVE BOX
PGY1 Note discussed with supervising resident and primary attending.    Patient is a 74y old  Female who presents with a chief complaint of generalised weakness (25 Oct 2019 10:54)    INTERVAL HPI/OVERNIGHT EVENTS:  No acute events occurred   Patient continues to endorse mild weakness with mild dyspnea  Hemodynamically stable and afebrile  Patient is tolerating a normal diet    MEDICATIONS  (STANDING):  aspirin  chewable 81 milliGRAM(s) Oral daily  atorvastatin 40 milliGRAM(s) Oral at bedtime  cefTRIAXone   IVPB      cefTRIAXone   IVPB 1000 milliGRAM(s) IV Intermittent once  enoxaparin Injectable 40 milliGRAM(s) SubCutaneous daily  losartan 25 milliGRAM(s) Oral daily  metoprolol tartrate 25 milliGRAM(s) Oral two times a day  oxybutynin XL 10 milliGRAM(s) Oral daily  pantoprazole  Injectable 40 milliGRAM(s) IV Push two times a day    MEDICATIONS  (PRN):    Allergies    No Known Allergies    Intolerances    REVIEW OF SYSTEMS:  CONSTITUTIONAL: Mild Weakness  RESPIRATORY: Improved dyspnea  CARDIOVASCULAR: No chest pain, palpitations, dizziness, or leg swelling  GASTROINTESTINAL: No abdominal or epigastric pain. No nausea, vomiting, or hematemesis; No diarrhea or constipation. No melena or hematochezia.  NEUROLOGICAL: No headaches, memory loss, loss of strength, numbness, or tremors  SKIN: No itching, burning, rashes, or lesions     Vital Signs Last 24 Hrs  T(C): 36.7 (25 Oct 2019 07:08), Max: 37.1 (24 Oct 2019 19:07)  T(F): 98.1 (25 Oct 2019 07:08), Max: 98.8 (24 Oct 2019 19:07)  HR: 91 (25 Oct 2019 11:01) (84 - 108)  BP: 90/63 (25 Oct 2019 07:08) (90/63 - 125/65)  BP(mean): --  RR: 18 (25 Oct 2019 07:08) (18 - 20)  SpO2: 93% (25 Oct 2019 11:01) (91% - 98%)    PHYSICAL EXAM:  GENERAL: NAD, well-groomed, well-developed  HEAD:  Atraumatic, Normocephalic  EYES: EOMI, PERRLA, conjunctiva and sclera clear  NECK: Supple, No JVD, Normal thyroid  CHEST/LUNG: Clear to percussion bilaterally; No rales, rhonchi, wheezing, or rubs  HEART: Regular rate and rhythm; No murmurs, rubs, or gallops  ABDOMEN: Soft, Nontender, Nondistended; Bowel sounds present  NERVOUS SYSTEM:  Alert & Oriented X3, Good concentration; Motor Strength 5/5 B/L   EXTREMITIES:  2+ Peripheral Pulses, No clubbing, cyanosis, or edema  SKIN;    LABS:                        8.7    6.78  )-----------( 228      ( 25 Oct 2019 06:34 )             28.3     10    142  |  112<H>  |  27<H>  ----------------------------<  103<H>  4.3   |  23  |  1.07    Ca    9.1      25 Oct 2019 06:34  Phos  3.3     10-25  Mg     2.0     10-25    TPro  7.3  /  Alb  3.6  /  TBili  0.3  /  DBili  x   /  AST  13  /  ALT  21  /  AlkPhos  64  10    PT/INR - ( 24 Oct 2019 10:28 )   PT: 11.5 sec;   INR: 1.03 ratio      Urinalysis Basic - ( 25 Oct 2019 04:41 )    Color: Yellow / Appearance: Slightly Turbid / S.015 / pH: x  Gluc: x / Ketone: Negative  / Bili: Negative / Urobili: Negative   Blood: x / Protein: 30 mg/dL / Nitrite: Positive   Leuk Esterase: Moderate / RBC: 2-5 /HPF / WBC >50 /HPF   Sq Epi: x / Non Sq Epi: Few /HPF / Bacteria: Many /HPF    CAPILLARY BLOOD GLUCOSE    RADIOLOGY & ADDITIONAL TESTS:    CXR  IMPRESSION:   Stable chest.  No focal consolidations.    JOJO BERRY M.D., ATTENDING RADIOLOGIST  This document has been electronically signed. Oct 24 2019  8:59PM    Imaging Personally Reviewed:  [ ] YES  [ ] NO    Consultant(s) Notes Reviewed:  [ ] YES  [ ] NO

## 2019-10-25 NOTE — DISCHARGE NOTE PROVIDER - CARE PROVIDER_API CALL
Bartolo Espinoza)  Internal Medicine  [address] [place] [address]  Phone: [phone]  Fax: [phone]  Follow Up Time: 2 weeks    Kristin Ramirez)  Internal Medicine  [address] [place] [address]  Phone: [phone]  Fax: [phone]  Follow Up Time: 2 weeks    Ross Castellanos)  Internal Medicine; Medical Oncology  [address] [place] [address]  Phone: [phone]  Fax: [phone]  Follow Up Time: 2 weeks

## 2019-10-25 NOTE — PROGRESS NOTE ADULT - PROBLEM SELECTOR PLAN 1
p/w weakness, pale skin, SOB, h/o CAD   - Hb 8.1 --> 9.0 --> 8.7 s/p 1 unit of pRBC  - pt denies any dark stools, active bleeding  - D/d :combination of  chronic blood loss   - Last EGD 5/19 : gastritis and esophagitis   - Last colonoscopy 5/19 : Internal hemorrhoids   - Pt has been taking PPI on and off   -s/p 1 PRBC in ED  - will start on Protonix 40 IV BID   - f/u CBC post tranfusion   -f/u CXR and UA   - GI consult Dr Argueta   - Pt eval  [ ] f/u FOBT  [ ] f/u Haptoglobin and LDH  [ ] f/u Consult with Dr. Castellanos

## 2019-10-25 NOTE — PROGRESS NOTE ADULT - SUBJECTIVE AND OBJECTIVE BOX
74 y/o F [place]ian speaking from home, walks with cane, no HHA ,lives with daughter with PMHx of HTN, HLD, CAD (1 stent in 2014), and arthritis presents to the ED with c/o worsening  generalized weakness x 1 day.  History obtained from the daughter at bedside as pt didnt wanted the  services. Since 1 week pt is easily getting fatigued, SOB on ambulation and is not waking as much as used to .Pt also reports mild chest discomfort on ambulation.Pt was last admitted with similar complaint in may2019 when she was found to have low Hb and was transfused 1 unit blood. EGD was done which showed   Gastrtitis,esophagitis. Colonoscopy showed Diverticulosis, internal hemorrhoids , Rectal Bx taken which came back normal. Pt denies any lightheadedness,CP  nausea, vomiting,fever , Cough, dark stools or other complaints.    ED course : Pt found to have Hb 8.1. Vitals stable . BUN/Cr 33/1.15 . ECG : NSR but elevated trop T1 0.438 T2 0.455 . ED contacted Dr Horvath who thinks elevated trops are from demand ischemia.     SH:  Denies Smoking, alcohol or drug use       Review of Systems:  Review of Systems: REVIEW OF SYSTEMS:  CONSTITUTIONAL: No  fevers or chills  EYES/ENT: No visual changes;  No vertigo or throat pain   RESPIRATORY: No cough, wheezing, hemoptysis; No shortness of breath  CARDIOVASCULAR: No chest pain or palpitations  GASTROINTESTINAL: No abdominal  pain. No nausea, vomiting. No diarrhea or constipation. No melena or hematochezia.  GENITOURINARY: No dysuria, frequency or hematuria  NEUROLOGICAL: No numbness or weakness SKIN: No itching, rashes	    pt seen in tele [ x ], reg med floor [   ], bed [x  ], chair at bedside [   ], a+o x3 [ x ], lethargic [  ],  nad [ x ]        Allergies    No Known Allergies        Vitals    T(F): 98.1 (10-25-19 @ 07:08), Max: 98.8 (10-24-19 @ 19:07)  HR: 84 (10-25-19 @ 07:08) (84 - 108)  BP: 90/63 (10-25-19 @ 07:08) (90/63 - 125/65)  RR: 18 (10-25-19 @ 07:08) (18 - 24)  SpO2: 94% (10-25-19 @ 07:08) (91% - 98%)  Wt(kg): --  CAPILLARY BLOOD GLUCOSE          Labs                          8.7    6.78  )-----------( 228      ( 25 Oct 2019 06:34 )             28.3       10-25    142  |  112<H>  |  27<H>  ----------------------------<  103<H>  4.3   |  23  |  1.07    Ca    9.1      25 Oct 2019 06:34  Phos  3.3     10-25  Mg     2.0     10-25    TPro  7.3  /  Alb  3.6  /  TBili  0.3  /  DBili  x   /  AST  13  /  ALT  21  /  AlkPhos  64  10-24      CARDIAC MARKERS ( 25 Oct 2019 06:34 )  0.288 ng/mL / x     / x     / x     / x      CARDIAC MARKERS ( 24 Oct 2019 21:21 )  0.495 ng/mL / x     / x     / x     / x      CARDIAC MARKERS ( 24 Oct 2019 15:28 )  0.455 ng/mL / x     / x     / x     / x      CARDIAC MARKERS ( 24 Oct 2019 10:28 )  0.438 ng/mL / x     / x     / x     / x                Radiology Results      Meds    MEDICATIONS  (STANDING):  aspirin  chewable 81 milliGRAM(s) Oral daily  atorvastatin 40 milliGRAM(s) Oral at bedtime  enoxaparin Injectable 40 milliGRAM(s) SubCutaneous daily  losartan 25 milliGRAM(s) Oral daily  metoprolol tartrate 25 milliGRAM(s) Oral two times a day  oxybutynin XL 10 milliGRAM(s) Oral daily  pantoprazole  Injectable 40 milliGRAM(s) IV Push two times a day      MEDICATIONS  (PRN):      Physical Exam    Neuro :  no focal deficits  Respiratory: CTA B/L  CV: RRR, S1S2, no murmurs,   Abdominal: Soft, NT, ND +BS,  Extremities: No edema, + peripheral pulses    ASSESSMENT    symptomatic anemia  elevated trop likely 2nd to demand ischemia  r/o acs   uti  h/o CAD (coronary artery disease)  HLD (hyperlipidemia)  S/P cardiac catheterization  History of hypertension  Arthritis        PLAN    cont tele,   acs protocol,   cont lopressor, aspirin, statin,   cardio cons  ce q8 x 4 abnormal noted above  f/u echo  s/p 1 prbc  f/u stool occult blood  gi cons   anemia panel  vit b12 levs  rocephin  f/u ucx  cont current meds

## 2019-10-25 NOTE — CONSULT NOTE ADULT - PROBLEM SELECTOR RECOMMENDATION 9
macrocytic anemia,  B12, folate were normal  to r/o  hemolysis, LDH elevated and haptoglobin is low normal  will do ret and direct shaunna  it can be liver disease too, f/u on hepatitis profile  check TSH  it can be MDS, ofter it has elevated LDH and lowish haptoglobin

## 2019-10-25 NOTE — CONSULT NOTE ADULT - ASSESSMENT
1. Weakness  - Likely 2nd to Anemia  - Monitor H/H  - Transfuse PRBC PRN  - Heme/onc Eval    2. Abnormal EKG  - ACS Protocol   - Tele monitoring  - Cardio eval  - DVT and GI PPX     3. Asthma  - Not in acute exacerbation  - Bronchodilators PRN  - O2 Supp PRN  - PFTs as OP    4. CHIQUITA  - Noncompliant with CPAP use  - Will further discuss use of oral appliance as OP  - F/U PFTs and PSG's as OP - will R/O underlying bronchospasm. 1. Weakness  - Likely 2nd to Anemia  - Monitor H/H  - Transfuse PRBC PRN  - Heme/onc Eval  -Anemia panel    2. Abnormal EKG  - ACS Protocol   - Tele monitoring  - Cardio eval  - DVT and GI PPX     3. Asthma  - Not in acute exacerbation  - Bronchodilators PRN  - O2 Supp PRN  - PFTs as OP    4. CHIQUITA  - Noncompliant with CPAP use  - Will further discuss use of oral appliance as OP  -  PSG's as OP

## 2019-10-25 NOTE — DISCHARGE NOTE PROVIDER - NSDCCPCAREPLAN_GEN_ALL_CORE_FT
PRINCIPAL DISCHARGE DIAGNOSIS  Diagnosis: Anemia  Assessment and Plan of Treatment: You were found to have symptomatic anemia from an unknown source upon admission and required 1 unit of blood transfusion. You adequately corrected following the blood transfusion and had better respiration and mental affect. Cause of the anemia was worked up by Dr. Castellanos and was presumed to be due to Myelodysplastic syndrome or multiple myeloma on the basis of an increased kappa lamda light chain ratio in your blood work. It was decided further investigation of this was to be performed outpatient. This could include bone marrow biopsy and other blood tests. You and your family agreed to this. Please follow-up with Dr. Castellanos outpatient in 2 weeks for further health care guidance.      SECONDARY DISCHARGE DIAGNOSES  Diagnosis: Hypertension  Assessment and Plan of Treatment: You were noted to have a diagnosis of hypertension. While admitted you had a low blood pressure on occassion. Your medications were modified to ensure that your blood pressure did not drop so low. Please continue only taking Metoprolol at home 12.5 mg Daily to maintain your BP. Please follow-up with your PCP and Cardiologist (Dr. Ramirez/Dr. Espinoza) for additional health care maintenance.    Diagnosis: JENI (acute kidney injury)  Assessment and Plan of Treatment: On admission you were found to be dehydrated and required fluids to help increase the function of your kidneys. Following administration of a bolus of fluids your kidney function recovered back to baseline. Please continue to adequately hydrate yourself and follow-up with your Primary Care Physicain in the next 1-2 weeks for further health maintenance.    Diagnosis: Acute UTI  Assessment and Plan of Treatment: On admission you were found to have a urinary tract infection on routine labs that we thought was also a possibility of your weakness and symptoms. For this you were treated with antibiotics. Please continue taking your antibiotics, Ceftin 250 mg BID for an additional 4 days to complete your course of treatment. Please follow-up with your PCP in the next 1-2 weeks or if similar symptoms present themself to you.

## 2019-10-25 NOTE — CONSULT NOTE ADULT - ASSESSMENT
73 year old lady was admitted for sob and weakness.  She was admiited in May for UTI and anemia.  EGD and colonoscopy were neg for bleeding.  She had one unit of PRBC.  Ferritin, B12, folate were normal.

## 2019-10-25 NOTE — DISCHARGE NOTE PROVIDER - PROVIDER TOKENS
PROVIDER:[TOKEN:[[id number]],FOLLOWUP:[2 weeks]],PROVIDER:[TOKEN:[[id number]],FOLLOWUP:[2 weeks]],PROVIDER:[TOKEN:[[id number]],FOLLOWUP:[2 weeks]]

## 2019-10-26 NOTE — PROGRESS NOTE ADULT - SUBJECTIVE AND OBJECTIVE BOX
CHIEF COMPLAINT:Patient is a 74y old  Female who presents with a chief complaint of generalised weakness.Pt appears comfortable.    	  REVIEW OF SYSTEMS:  CONSTITUTIONAL: No fever, weight loss, or fatigue  EYES: No eye pain, visual disturbances, or discharge  ENT:  No difficulty hearing, tinnitus, vertigo; No sinus or throat pain  NECK: No pain or stiffness  RESPIRATORY: No cough, wheezing, chills or hemoptysis; No Shortness of Breath  CARDIOVASCULAR: No chest pain, palpitations, passing out, dizziness, or leg swelling  GASTROINTESTINAL: No abdominal or epigastric pain. No nausea, vomiting, or hematemesis; No diarrhea or constipation. No melena or hematochezia.  GENITOURINARY: No dysuria, frequency, hematuria, or incontinence  NEUROLOGICAL: No headaches, memory loss, loss of strength, numbness, or tremors  SKIN: No itching, burning, rashes, or lesions   LYMPH Nodes: No enlarged glands  ENDOCRINE: No heat or cold intolerance; No hair loss  MUSCULOSKELETAL: No joint pain or swelling; No muscle, back, or extremity pain  PSYCHIATRIC: No depression, anxiety, mood swings, or difficulty sleeping  HEME/LYMPH: No easy bruising, or bleeding gums  ALLERGY AND IMMUNOLOGIC: No hives or eczema	    PHYSICAL EXAM:  T(C): 36.8 (10-26-19 @ 08:12), Max: 37.1 (10-25-19 @ 11:10)  HR: 85 (10-26-19 @ 08:12) (79 - 106)  BP: 113/70 (10-26-19 @ 08:12) (100/64 - 128/90)  RR: 18 (10-26-19 @ 08:12) (18 - 20)  SpO2: 95% (10-26-19 @ 08:12) (93% - 97%)  Wt(kg): --  I&O's Summary    25 Oct 2019 07:01  -  26 Oct 2019 07:00  --------------------------------------------------------  IN: 200 mL / OUT: 700 mL / NET: -500 mL        Appearance: Normal	  HEENT:   Normal oral mucosa, PERRL, EOMI	  Lymphatic: No lymphadenopathy  Cardiovascular: Normal S1 S2, No JVD, No murmurs, No edema  Respiratory: Lungs clear to auscultation	  Psychiatry: A & O x 3, Mood & affect appropriate  Gastrointestinal:  Soft, Non-tender, + BS	  Skin: No rashes, No ecchymoses, No cyanosis	  Neurologic: Non-focal  Extremities: Normal range of motion, No clubbing, cyanosis or edema  Vascular: Peripheral pulses palpable 2+ bilaterally    MEDICATIONS  (STANDING):  aspirin  chewable 81 milliGRAM(s) Oral daily  atorvastatin 40 milliGRAM(s) Oral at bedtime  cefTRIAXone   IVPB      cefTRIAXone   IVPB 1000 milliGRAM(s) IV Intermittent every 24 hours  enoxaparin Injectable 40 milliGRAM(s) SubCutaneous daily  losartan 25 milliGRAM(s) Oral daily  metoprolol tartrate 25 milliGRAM(s) Oral two times a day  oxybutynin XL 10 milliGRAM(s) Oral daily  pantoprazole  Injectable 40 milliGRAM(s) IV Push two times a day        	  LABS:	 	      CARDIAC MARKERS ( 25 Oct 2019 06:34 )  0.288 ng/mL / x     / x     / x     / x      CARDIAC MARKERS ( 24 Oct 2019 21:21 )  0.495 ng/mL / x     / x     / x     / x      CARDIAC MARKERS ( 24 Oct 2019 15:28 )  0.455 ng/mL / x     / x     / x     / x      CARDIAC MARKERS ( 24 Oct 2019 10:28 )  0.438 ng/mL / x     / x     / x     / x                                    8.6    6.72  )-----------( 225      ( 26 Oct 2019 07:23 )             28.0     10-26    143  |  113<H>  |  28<H>  ----------------------------<  101<H>  4.5   |  24  |  1.15    Ca    8.8      26 Oct 2019 07:23  Phos  3.2     10-26  Mg     2.0     10-26    TPro  7.3  /  Alb  3.6  /  TBili  0.3  /  DBili  x   /  AST  13  /  ALT  21  /  AlkPhos  64  10-24      Lipid Profile: Cholesterol 180  LDL 92  HDL 37      HgA1c: Hemoglobin A1C, Whole Blood: 5.9 % (10-25 @ 10:11)    TSH: Thyroid Stimulating Hormone, Serum: 0.68 uU/mL (10-26 @ 07:23)  Thyroid Stimulating Hormone, Serum: 0.47 uU/mL (10-25 @ 06:34)      	  Iron with Total Binding Capacity (05.13.19 @ 18:07)    % Saturation, Iron: 14 %    Iron - Total Binding Capacity.: 207 ug/dL    Iron Total, Serum: 29 ug/dL    Unsaturated Iron Binding Capacity: 178 ug/dL        Lactate Dehydrogenase, Serum (10.25.19 @ 11:20)    Lactate Dehydrogenase, Serum: 264 U/L    Haptoglobin, Serum: 56 mg/dL (10.25.19 @ 14:12)

## 2019-10-26 NOTE — PROGRESS NOTE ADULT - SUBJECTIVE AND OBJECTIVE BOX
Patient is a 74y old  Female who presents with a chief complaint of generalised weakness (25 Oct 2019 19:00)    pt seen in tele [ x ], reg med floor [   ], bed [x  ], chair at bedside [   ], a+o x3 [ x ], lethargic [  ],  nad [ x ]      Allergies    No Known Allergies        Vitals    T(F): 97.5 (10-26-19 @ 04:33), Max: 98.8 (10-25-19 @ 11:10)  HR: 82 (10-26-19 @ 04:33) (79 - 106)  BP: 107/72 (10-26-19 @ 04:33) (100/64 - 128/90)  RR: 18 (10-26-19 @ 04:33) (18 - 20)  SpO2: 96% (10-26-19 @ 04:33) (93% - 97%)  Wt(kg): --  CAPILLARY BLOOD GLUCOSE          Labs                          8.6    6.72  )-----------( 225      ( 26 Oct 2019 07:23 )             28.0       10-25    142  |  112<H>  |  27<H>  ----------------------------<  103<H>  4.3   |  23  |  1.07    Ca    9.1      25 Oct 2019 06:34  Phos  3.3     10-25  Mg     2.0     10-25    Ferritin, Serum: 172 ng/mL (10.26.19 @ 00:52)    TPro  7.3  /  Alb  3.6  /  TBili  0.3  /  DBili  x   /  AST  13  /  ALT  21  /  AlkPhos  64  10-24    Thyroid Stimulating Hormone, Serum: 0.47 uU/mL (10.25.19 @ 06:34)        CARDIAC MARKERS ( 25 Oct 2019 06:34 )  0.288 ng/mL / x     / x     / x     / x      CARDIAC MARKERS ( 24 Oct 2019 21:21 )  0.495 ng/mL / x     / x     / x     / x      CARDIAC MARKERS ( 24 Oct 2019 15:28 )  0.455 ng/mL / x     / x     / x     / x      CARDIAC MARKERS ( 24 Oct 2019 10:28 )  0.438 ng/mL / x     / x     / x     / x        Vitamin B12, Serum: 888 pg/mL (10.25.19 @ 09:42)    Hepatitis C Antibody Test (10.25.19 @ 09:47)    Hepatitis C Virus S/CO Ratio: 0.10 S/CO    Hepatitis C Virus Interpretation: Nonreact: Hepatitis C AB  S/CO Ratio                        Interpretation  < 1.00                                   Non-Reactive  1.00 - 4.99                         Weakly-Reactive  >= 5.00                                Reactive  Non-Reactive: A person witha non-reactive HCV antibody result is  considered uninfected.  No further action is needed unless recent  infection is suspected.  In these cases, consider repeat testing later to  detect seroconversion..  Weakly-Reactive: HCV antibody test is abnormal, HCV RNA Qualitative test  will follow.  Reactive: HCV antibody test is abnormal, HCV RNA Qualitative test will  follow.  Note: HCV antibody testing is performed on the [employer]  system.    Direct Shaunna Profile (10.25.19 @ 21:48)    Dir Antiglob Polyspecific Interpretation: NEG            Radiology Results      Meds    MEDICATIONS  (STANDING):  aspirin  chewable 81 milliGRAM(s) Oral daily  atorvastatin 40 milliGRAM(s) Oral at bedtime  cefTRIAXone   IVPB      cefTRIAXone   IVPB 1000 milliGRAM(s) IV Intermittent every 24 hours  enoxaparin Injectable 40 milliGRAM(s) SubCutaneous daily  losartan 25 milliGRAM(s) Oral daily  metoprolol tartrate 25 milliGRAM(s) Oral two times a day  oxybutynin XL 10 milliGRAM(s) Oral daily  pantoprazole  Injectable 40 milliGRAM(s) IV Push two times a day      MEDICATIONS  (PRN):      Physical Exam      Neuro :  no focal deficits  Respiratory: CTA B/L  CV: RRR, S1S2, no murmurs,   Abdominal: Soft, NT, ND +BS,  Extremities: No edema, + peripheral pulses    ASSESSMENT    symptomatic anemia  elevated trop likely 2nd to demand ischemia  r/o acs   uti  h/o CAD (coronary artery disease)  HLD (hyperlipidemia)  S/P cardiac catheterization  History of hypertension  Arthritis        PLAN    cont tele,   acs protocol,   cont lopressor, aspirin, statin,   cardio f/u  ce q8 x 4 abnormal noted above  f/u echo  s/p 1 prbc  f/u stool occult blood  gi cons   anemia panel  vit b12 levs wnl  heme onc f/u  direct shaunna neg noted above  hepatitis c neg noted above  TSH wnl noted above  f/u spep   cont rocephin  f/u ucx  cont current meds

## 2019-10-26 NOTE — PROGRESS NOTE ADULT - SUBJECTIVE AND OBJECTIVE BOX
Patient is a 74y old  Female who presents with a chief complaint of generalized weakness (26 Oct 2019 10:18)    Pt is awake, alert, lying in bed in NAD. Has hx of CHIQUITA but did not tolerate CPAP machine due to claustrophobia.       INTERVAL HPI/OVERNIGHT EVENTS:      VITAL SIGNS:  T(F): 98.3 (10-26-19 @ 08:12)  HR: 85 (10-26-19 @ 08:12)  BP: 113/70 (10-26-19 @ 08:12)  RR: 18 (10-26-19 @ 08:12)  SpO2: 95% (10-26-19 @ 08:12)  Wt(kg): --  I&O's Detail    25 Oct 2019 07:01  -  26 Oct 2019 07:00  --------------------------------------------------------  IN:    IV PiggyBack: 50 mL    Oral Fluid: 150 mL  Total IN: 200 mL    OUT:    Voided: 700 mL  Total OUT: 700 mL    Total NET: -500 mL              REVIEW OF SYSTEMS:    CONSTITUTIONAL:  No fevers, chills, sweats    HEENT:  Eyes:  No diplopia or blurred vision. ENT:  No earache, sore throat or runny nose.    CARDIOVASCULAR:  No pressure, squeezing, tightness, or heaviness about the chest; no palpitations.    RESPIRATORY:  Per HPI    GASTROINTESTINAL:  No abdominal pain, nausea, vomiting or diarrhea.    GENITOURINARY:  No dysuria, frequency or urgency.    NEUROLOGIC:  No paresthesias, fasciculations, seizures or weakness.    PSYCHIATRIC:  No disorder of thought or mood.      PHYSICAL EXAM:    Constitutional: Well developed and nourished  Eyes:Perrla  ENMT: normal  Neck:supple  Respiratory: good air entry  Cardiovascular: S1 S2 regular  Gastrointestinal: Soft, Non tender  Extremities: No edema  Vascular:normal  Neurological:Awake, alert,Ox3  Musculoskeletal:Normal      MEDICATIONS  (STANDING):  aspirin  chewable 81 milliGRAM(s) Oral daily  atorvastatin 40 milliGRAM(s) Oral at bedtime  cefTRIAXone   IVPB      cefTRIAXone   IVPB 1000 milliGRAM(s) IV Intermittent every 24 hours  enoxaparin Injectable 40 milliGRAM(s) SubCutaneous daily  losartan 25 milliGRAM(s) Oral daily  metoprolol tartrate 25 milliGRAM(s) Oral two times a day  oxybutynin XL 10 milliGRAM(s) Oral daily  pantoprazole  Injectable 40 milliGRAM(s) IV Push two times a day    MEDICATIONS  (PRN):      Allergies    No Known Allergies    Intolerances        LABS:                        8.6    6.72  )-----------( 225      ( 26 Oct 2019 07:23 )             28.0     10-    143  |  113<H>  |  28<H>  ----------------------------<  101<H>  4.5   |  24  |  1.15    Ca    8.8      26 Oct 2019 07:23  Phos  3.2     10-26  Mg     2.0     10-26        Urinalysis Basic - ( 25 Oct 2019 04:41 )    Color: Yellow / Appearance: Slightly Turbid / S.015 / pH: x  Gluc: x / Ketone: Negative  / Bili: Negative / Urobili: Negative   Blood: x / Protein: 30 mg/dL / Nitrite: Positive   Leuk Esterase: Moderate / RBC: 2-5 /HPF / WBC >50 /HPF   Sq Epi: x / Non Sq Epi: Few /HPF / Bacteria: Many /HPF        CARDIAC MARKERS ( 25 Oct 2019 06:34 )  0.288 ng/mL / x     / x     / x     / x      CARDIAC MARKERS ( 24 Oct 2019 21:21 )  0.495 ng/mL / x     / x     / x     / x      CARDIAC MARKERS ( 24 Oct 2019 15:28 )  0.455 ng/mL / x     / x     / x     / x          CAPILLARY BLOOD GLUCOSE            RADIOLOGY & ADDITIONAL TESTS:    CXR:    < from: Xray Chest 1 View- PORTABLE-Urgent (10.24.19 @ 18:38) >    IMPRESSION:   Stable chest.  No focal consolidations.    < end of copied text >  Ct scan chest:    ekg;    echo: Patient is a 74y old  Female who presents with a chief complaint of generalized weakness (26 Oct 2019 10:18)    Pt is awake, alert, lying in bed in NAD. Has hx of CHIQUITA but did not tolerate CPAP machine due to claustrophobia. No cough or sob at rest      INTERVAL HPI/OVERNIGHT EVENTS:      VITAL SIGNS:  T(F): 98.3 (10-26-19 @ 08:12)  HR: 85 (10-26-19 @ 08:12)  BP: 113/70 (10-26-19 @ 08:12)  RR: 18 (10-26-19 @ 08:12)  SpO2: 95% (10-26-19 @ 08:12)  Wt(kg): --  I&O's Detail    25 Oct 2019 07:01  -  26 Oct 2019 07:00  --------------------------------------------------------  IN:    IV PiggyBack: 50 mL    Oral Fluid: 150 mL  Total IN: 200 mL    OUT:    Voided: 700 mL  Total OUT: 700 mL    Total NET: -500 mL              REVIEW OF SYSTEMS:    CONSTITUTIONAL:  No fevers, chills, sweats    HEENT:  Eyes:  No diplopia or blurred vision. ENT:  No earache, sore throat or runny nose.    CARDIOVASCULAR:  No pressure, squeezing, tightness, or heaviness about the chest; no palpitations.    RESPIRATORY:  Per HPI    GASTROINTESTINAL:  No abdominal pain, nausea, vomiting or diarrhea.    GENITOURINARY:  No dysuria, frequency or urgency.    NEUROLOGIC:  No paresthesias, fasciculations, seizures or weakness.    PSYCHIATRIC:  No disorder of thought or mood.      PHYSICAL EXAM:    Constitutional: Well developed and nourished  Eyes:Perrla  ENMT: normal  Neck:supple  Respiratory: good air entry  Cardiovascular: S1 S2 regular  Gastrointestinal: Soft, Non tender  Extremities: No edema  Vascular:normal  Neurological:Awake, alert,Ox3  Musculoskeletal:Normal      MEDICATIONS  (STANDING):  aspirin  chewable 81 milliGRAM(s) Oral daily  atorvastatin 40 milliGRAM(s) Oral at bedtime  cefTRIAXone   IVPB      cefTRIAXone   IVPB 1000 milliGRAM(s) IV Intermittent every 24 hours  enoxaparin Injectable 40 milliGRAM(s) SubCutaneous daily  losartan 25 milliGRAM(s) Oral daily  metoprolol tartrate 25 milliGRAM(s) Oral two times a day  oxybutynin XL 10 milliGRAM(s) Oral daily  pantoprazole  Injectable 40 milliGRAM(s) IV Push two times a day    MEDICATIONS  (PRN):      Allergies    No Known Allergies    Intolerances        LABS:                        8.6    6.72  )-----------( 225      ( 26 Oct 2019 07:23 )             28.0     10    143  |  113<H>  |  28<H>  ----------------------------<  101<H>  4.5   |  24  |  1.15    Ca    8.8      26 Oct 2019 07:23  Phos  3.2     10-26  Mg     2.0     10-26        Urinalysis Basic - ( 25 Oct 2019 04:41 )    Color: Yellow / Appearance: Slightly Turbid / S.015 / pH: x  Gluc: x / Ketone: Negative  / Bili: Negative / Urobili: Negative   Blood: x / Protein: 30 mg/dL / Nitrite: Positive   Leuk Esterase: Moderate / RBC: 2-5 /HPF / WBC >50 /HPF   Sq Epi: x / Non Sq Epi: Few /HPF / Bacteria: Many /HPF        CARDIAC MARKERS ( 25 Oct 2019 06:34 )  0.288 ng/mL / x     / x     / x     / x      CARDIAC MARKERS ( 24 Oct 2019 21:21 )  0.495 ng/mL / x     / x     / x     / x      CARDIAC MARKERS ( 24 Oct 2019 15:28 )  0.455 ng/mL / x     / x     / x     / x          CAPILLARY BLOOD GLUCOSE            RADIOLOGY & ADDITIONAL TESTS:    CXR:    < from: Xray Chest 1 View- PORTABLE-Urgent (10.24.19 @ 18:38) >    IMPRESSION:   Stable chest.  No focal consolidations.    < end of copied text >  Ct scan chest:    ekg;    echo:

## 2019-10-27 NOTE — PROGRESS NOTE ADULT - PROBLEM SELECTOR PLAN 1
p/w weakness, pale skin, SOB, h/o CAD   - Hb 8.1 --> 9.0 --> 8.7 s/p 1 unit of pRBC  - pt denies any dark stools, active bleeding  - D/d :combination of  chronic blood loss   - Last EGD 5/19 : gastritis and esophagitis   - Last colonoscopy 5/19 : Internal hemorrhoids   - Pt has been taking PPI on and off   -s/p 1 PRBC in ED  - will start on Protonix 40 IV BID   - f/u CBC post tranfusion   -f/u CXR and UA   - GI consult Dr Argueta   - Pt eval  FOBT negative  [ ] f/u Haptoglobin and LDH  [ ] f/u Consult with Dr. Castellanos p/w weakness, pale skin, SOB, h/o CAD   - Hb 8.1 --> 9.0 --> 8.7 s/p 1 unit of pRBC  - pt denies any dark stools, active bleeding  - D/d :combination of  chronic blood loss   - Last EGD 5/19 : gastritis and esophagitis   - Last colonoscopy 5/19 : Internal hemorrhoids   - Pt has been taking PPI on and off   -s/p 1 PRBC in ED  - will start on Protonix 40 IV BID   - f/u CBC post tranfusion   -f/u CXR and UA   - GI consult Dr Argueta   - Pt eval  FOBT negative  Haptoglobin is normal, and LDH is elevated  Dr. Castellanos to to consider MDS  Patient continues to maintain her hemoglobin levels

## 2019-10-27 NOTE — CONSULT NOTE ADULT - ASSESSMENT
1. The etiology for iron deficiency anemia in this patient is not clear  2. Pancreatic tail mass  R/o pancreatic Ca  3. Diverticulosis but no diverticulitis    Suggestions:    1. Monitor H/H  2. Transfuse PRBC as needed  3. Protonix daily  4. Consider capsule endoscopy  5. Avoid NSAID  6. Check CEA and   7. DVT prophylaxis

## 2019-10-27 NOTE — CONSULT NOTE ADULT - SUBJECTIVE AND OBJECTIVE BOX
Patient is a 74y old  Female who presents with a chief complaint of Generalized Weakness (25 Oct 2019 11:18)      HPI:  72 y/o F Cambodian speaking from home, walks with cane, no HHA ,lives with daughter with PMHx of HTN, HLD, CAD (1 stent in 2014), and arthritis presents to the ED with c/o worsening  generalized weakness x 1 day.  History obtained from the daughter at bedside as pt didnt wanted the  services. Since 1 week pt is easily getting fatigued, SOB on ambulation and is not waking as much as used to .Pt also reports mild chest discomfort on ambulation.Pt was last admitted with similar complaint in may2019 when she was found to have low Hb and was transfused 1 unit blood. EGD was done which showed   Gastrtitis,esophagitis. Colonoscopy showed Diverticulosis, internal hemorrhoids , Rectal Bx taken which came back normal. Pt denies any lightheadedness,CP  nausea, vomiting,fever , Cough, dark stools, back pain, or other complaints.    ED course : Pt found to have Hb 8.1. Vitals stable . BUN/Cr 33/1.15 . ECG : NSR but elevated trop T1 0.438 T2 0.455 . ED contacted Dr Horvath who thinks elevated trops are from demand ischemia.     SH:  Denies Smoking, alcohol or drug use (24 Oct 2019 17:57)       ROS:  Negative except for:    PAST MEDICAL & SURGICAL HISTORY:  CAD (coronary artery disease)  HLD (hyperlipidemia)  S/P cardiac catheterization: stent x 1  History of hypertension  Arthritis  No significant past surgical history      SOCIAL HISTORY:    FAMILY HISTORY:  No pertinent family history in first degree relatives      MEDICATIONS  (STANDING):  aspirin  chewable 81 milliGRAM(s) Oral daily  atorvastatin 40 milliGRAM(s) Oral at bedtime  cefTRIAXone   IVPB      enoxaparin Injectable 40 milliGRAM(s) SubCutaneous daily  losartan 25 milliGRAM(s) Oral daily  metoprolol tartrate 25 milliGRAM(s) Oral two times a day  oxybutynin XL 10 milliGRAM(s) Oral daily  pantoprazole  Injectable 40 milliGRAM(s) IV Push two times a day    MEDICATIONS  (PRN):      Allergies    No Known Allergies    Intolerances        Vital Signs Last 24 Hrs  T(C): 36.4 (25 Oct 2019 15:06), Max: 37.1 (24 Oct 2019 19:07)  T(F): 97.5 (25 Oct 2019 15:06), Max: 98.8 (24 Oct 2019 19:07)  HR: 92 (25 Oct 2019 17:14) (84 - 108)  BP: 122/76 (25 Oct 2019 17:14) (90/63 - 122/76)  BP(mean): --  RR: 20 (25 Oct 2019 15:06) (18 - 20)  SpO2: 94% (25 Oct 2019 15:06) (93% - 98%)    PHYSICAL EXAM  General: adult in NAD  HEENT: clear oropharynx, anicteric sclera, pink conjunctiva  Neck: supple  CV: normal S1/S2 with no murmur rubs or gallops  Lungs: positive air movement b/l ant lungs,clear to auscultation, no wheezes, no rales  Abdomen: soft non-tender non-distended, no hepatosplenomegaly  Ext: no clubbing cyanosis or edema  Skin: no rashes and no petechiae  Neuro: alert and oriented X 4, no focal deficits      LABS:                          8.7    6.78  )-----------( 228      ( 25 Oct 2019 06:34 )             28.3         Mean Cell Volume : 99.3 fl  Mean Cell Hemoglobin : 30.5 pg  Mean Cell Hemoglobin Concentration : 30.7 gm/dL  Auto Neutrophil # : 2.65 K/uL  Auto Lymphocyte # : 3.13 K/uL  Auto Monocyte # : 0.93 K/uL  Auto Eosinophil # : 0.01 K/uL  Auto Basophil # : 0.04 K/uL  Auto Neutrophil % : 39.1 %  Auto Lymphocyte % : 46.2 %  Auto Monocyte % : 13.7 %  Auto Eosinophil % : 0.1 %  Auto Basophil % : 0.6 %      Serial CBC's  10-25 @ 06:34  Hct-28.3 / Hgb-8.7 / Plat-228 / RBC-2.85 / WBC-6.78  Serial CBC's  10-24 @ 21:21  Hct-29.0 / Hgb-9.0 / Plat-247 / RBC-2.90 / WBC-9.11  Serial CBC's  10-24 @ 10:28  Hct-26.8 / Hgb-8.1 / Plat-278 / RBC-2.63 / WBC-7.30      10-25    142  |  112<H>  |  27<H>  ----------------------------<  103<H>  4.3   |  23  |  1.07    Ca    9.1      25 Oct 2019 06:34  Phos  3.3     10-25  Mg     2.0     10-25    TPro  7.3  /  Alb  3.6  /  TBili  0.3  /  DBili  x   /  AST  13  /  ALT  21  /  AlkPhos  64  10-24      PT/INR - ( 24 Oct 2019 10:28 )   PT: 11.5 sec;   INR: 1.03 ratio             Vitamin B12, Serum: 888 pg/mL (10-25 @ 09:42)              BLOOD SMEAR INTERPRETATION:       RADIOLOGY & ADDITIONAL STUDIES:
[  ] STAT REQUEST              [X  ] ROUTINE REQUEST    Patient is a 74 year old female with sever anemia. GI consulted to evaluate.        HPI:  73 year old female with multiple medical problems presented with worsening  generalized weakness found to have sever symptomatic anemia. Patient c/o constipation and bloating but denies nausea, vomiting, abdominal pain, hematemesis, hematochezia, melena, fever, chills, chest pain, SOB, cough, palpitation, cough, hematuria, dysuria or diarrhea.      f x 1 day.  History obtained from the daughter at bedside as pt didnt wanted the  services. Since 1 week pt is easily getting fatigued, SOB on ambulation and is not waking as much as used to .Pt also reports mild chest discomfort on ambulation.Pt was last admitted with similar complaint in 2019 when she was found to have low Hb and was transfused 1 unit blood. EGD was done which showed   Gastrtitis,esophagitis. Colonoscopy showed Diverticulosis, internal hemorrhoids , Rectal Bx taken which came back normal. Pt denies any lightheadedness,CP  nausea, vomiting,fever , Cough, dark stools or other complaints.    ED course : Pt found to have Hb 8.1. Vitals stable . BUN/Cr 33/1.15 . ECG : NSR but elevated trop T1 0.438 T2 0.455 . ED contacted Dr Horvath who thinks elevated trops are from demand ischemia.     SH:  Denies Smoking, alcohol or drug use (24 Oct 2019 17:57)      PAIN MANAGEMENT:  Pain Scale:                 /10  Pain Location:        Prior Colonoscopy:   2019      PAST MEDICAL HISTORY  CAD   HLD    HTN  Arthritis  Gastritis  Esophagitis  Diverticulosis      PAST SURGICAL HISTORY  Cardiac cath      Allergies    No Known Allergies          MEDICATION:  aspirin  chewable 81 milliGRAM(s) Oral daily  atorvastatin 40 milliGRAM(s) Oral at bedtime  cefTRIAXone   IVPB      cefTRIAXone   IVPB 1000 milliGRAM(s) IV Intermittent every 24 hours  enoxaparin Injectable 40 milliGRAM(s) SubCutaneous daily  losartan 25 milliGRAM(s) Oral daily  metoprolol tartrate 25 milliGRAM(s) Oral two times a day  oxybutynin XL 10 milliGRAM(s) Oral daily  pantoprazole  Injectable 40 milliGRAM(s) IV Push two times a day         SOCIAL HISTORY  Advanced Directives:       [ X ] Full Code       [  ] DNR  Marital Status:         [  ] M      [ X ] S      [  ] D       [  ] W  Children:       [X  ] Yes      [  ] No  Occupation:        [  ] Employed       [ X ] Unemployed       [  ] Retired  Diet:       [ X ] Regular       [  ] PEG feeding          [  ] NG tube feeding  Drug Use:           [X  ] Patient denied          [  ] Yes  Alcohol:           [ X ] No             [  ] Yes (socially)         [  ] Yes (chronic)  Tobacco:           [  ] Yes           [ X ] No        FAMILY HISTORY  [ X ] Heart Disease            [  ] Diabetes             [  ] HTN             [  ] Colon Cancer             [  ] Stomach Cancer              [  ] Pancreatic Cancer        VITAL SIGNS   Vital Signs Last 24 Hrs  T(C): 36.8 (27 Oct 2019 11:48), Max: 36.8 (26 Oct 2019 23:35)  T(F): 98.3 (27 Oct 2019 11:48), Max: 98.3 (26 Oct 2019 23:35)  HR: 90 (27 Oct 2019 11:48) (85 - 96)  BP: 114/65 (27 Oct 2019 11:48) (100/68 - 134/86)   RR: 18 (27 Oct 2019 11:48) (17 - 18)  SpO2: 98% (27 Oct 2019 11:48) (95% - 98%)  Daily Weight in k.3 (27 Oct 2019 04:49)           CBC Full  -  ( 27 Oct 2019 07:05 )  WBC Count : 6.46 K/uL  RBC Count : 2.73 M/uL  Hemoglobin : 8.4 g/dL  Hematocrit : 27.4 %  Platelet Count - Automated : 228 K/uL  Mean Cell Volume : 100.4 fl  Mean Cell Hemoglobin : 30.8 pg  Mean Cell Hemoglobin Concentration : 30.7 gm/dL  Auto Neutrophil # : 2.00 K/uL  Auto Lymphocyte # : 3.44 K/uL  Auto Monocyte # : 0.91 K/uL  Auto Eosinophil # : 0.05 K/uL  Auto Basophil # : 0.04 K/uL  Auto Neutrophil % : 30.9 %  Auto Lymphocyte % : 53.3 %  Auto Monocyte % : 14.1 %  Auto Eosinophil % : 0.8 %  Auto Basophil % : 0.6 %      10-27    144  |  114<H>  |  25<H>  ----------------------------<  97  4.2   |  23  |  1.07    Ca    9.3      27 Oct 2019 07:05  Phos  3.7     10-27  Mg     2.1     10-27    TPro  6.4  /  Alb  x   /  TBili  x   /  DBili  x   /  AST  x   /  ALT  x   /  AlkPhos  x   10-25    Occult Blood, Feces (10.26.19 @ 11:30)    Occult Blood, Feces: Negative    Occult Blood, Feces (19 @ 15:11)    Occult Blood, Feces: Positive    Iron with Total Binding Capacity (19 @ 18:07)    Iron - Total Binding Capacity.: 207 ug/dL    % Saturation, Iron: 14 %    Iron Total, Serum: 29 ug/dL    Unsaturated Iron Binding Capacity: 178 ug/dL    Urinalysis + Microscopic Examination (10.25.19 @ 04:41)    Urine Appearance: Slightly Turbid    Urobilinogen: Negative    Blood, Urine: Trace    Glucose Qualitative, Urine: Negative    Bilirubin: Negative    Color: Yellow    Specific Gravity: 1.015    Protein, Urine: 30 mg/dL    pH Urine: 5.0    Leukocyte Esterase Concentration: Moderate    Nitrite: Positive    Ketone - Urine: Negative    Red Blood Cell - Urine: 2-5 /HPF    White Blood Cell - Urine: >50 /HPF    Epithelial Cells: Few /HPF    Bacteria: Many /HPF    Comment - Urine: Few branched yeast present.         ECG  Ventricular Rate 88 BPM    Atrial Rate 88 BPM    P-R Interval 168 ms    QRS Duration 96 ms    Q-T Interval 370 ms    QTC Calculation(Bezet) 447 ms    P Axis 38 degrees    R Axis -13 degrees    T Axis -9 degrees    Diagnosis Line Normal sinus rhythm  Low voltage QRS  Cannot rule out Inferior infarct , age undetermined  Abnormal ECG        RADIOLOGY/IMAGING                EXAM:  CT ABDOMEN AND PELVIS                            PROCEDURE DATE:  10/26/2019          INTERPRETATION:  Clinical information: Pancreatic tail mass    Comparison: 2019    PROCEDURE:     CT of the Abdomen and Pelvis was performed without intravenous contrast.    Evaluation of abdominal and pelvic viscera, lymph nodes and vasculature   is limited without intravenous contrast.    FINDINGS:    LOWER CHEST: within normal limits    ABDOMEN:  LIVER: within normal limits  BILE DUCTS: normal caliber  GALLBLADDER: no calcified gallstones. normal caliber wall  PANCREAS: No mass or ductal dilatation. Fatty atrophy of the proximal   pancreas with sparing of the tail. Overall stable from 2019.  SPLEEN: within normal limits  ADRENALS: within normal limits  KIDNEYS: Bilateral cysts and hypodensities small to characterize. Mild   left-sided pelviectasis and inflammatory changes in bilateral renal   pelvis, stable from .    PELVIS:  REPRODUCTIVE ORGANS: Air in the endometrial cavity, new from . No   compelling fistula.  BLADDER: within normal limits    BOWEL: Sigmoid diverticulosis without diverticulitis. Normal appendix.  PERITONEUM: no ascites or free air, no fluid collection  RETROPERITONEUM: no enlarged retroperitoneal or pelvic nodes.    VESSELS: within normal limits for a noncontrast study.  ABDOMINAL WALL: Nonobstructed bowel containing ventral hernia..  MUSCULOSKELETAL: within normal limits.    IMPRESSION:     Stable from  appearance of the pancreatic tail as described, no   compelling mass.    Air in the endometrial cavity, new from . No compelling fistula.   Correlate for recent instrumentation, endometritis. Other findings as   above, stable from .
CHIEF COMPLAINT:Patient is a 74y old  Female who presents with a chief complaint of generalised weakness.      HPI:  74 y/o F Gambian speaking from home, walks with cane, no HHA ,lives with daughter with PMHx of HTN, HLD, CAD (1 stent in 2014), and arthritis presents to the ED with c/o worsening  generalized weakness x 1 day.  History obtained from the daughter at bedside as pt didnt wanted the  services. Since 1 week pt is easily getting fatigued, SOB on ambulation and is not waking as much as used to .Pt also reports mild chest discomfort on ambulation.Pt was last admitted with similar complaint in may2019 when she was found to have low Hb and was transfused 1 unit blood. EGD was done which showed   Gastrtitis,esophagitis. Colonoscopy showed Diverticulosis, internal hemorrhoids , Rectal Bx taken which came back normal. Pt denies any lightheadedness,CP  nausea, vomiting,fever , Cough, dark stools or other complaints.    ED course : Pt found to have Hb 8.1. Vitals stable . BUN/Cr 33/1.15 . ECG : NSR but elevated trop T1 0.438 T2 0.455 . ED contacted Dr Horvath who thinks elevated trops are from demand ischemia.       PAST MEDICAL & SURGICAL HISTORY:  CAD (coronary artery disease)  HLD (hyperlipidemia)  S/P cardiac catheterization: stent x 1  History of hypertension  Arthritis  PUD      MEDICATIONS  (STANDING):  aspirin  chewable 81 milliGRAM(s) Oral daily  atorvastatin 40 milliGRAM(s) Oral at bedtime  enoxaparin Injectable 40 milliGRAM(s) SubCutaneous daily  losartan 25 milliGRAM(s) Oral daily  metoprolol tartrate 25 milliGRAM(s) Oral two times a day  oxybutynin XL 10 milliGRAM(s) Oral daily  pantoprazole  Injectable 40 milliGRAM(s) IV Push two times a day    MEDICATIONS  (PRN):      FAMILY HISTORY: No hx of CAD      SOCIAL HISTORY:    [x ] Non-smoker    [x ] Alcohol-denies    Allergies    No Known Allergies    Intolerances    	    REVIEW OF SYSTEMS:  CONSTITUTIONAL: No fever, weight loss, or fatigue  EYES: No eye pain, visual disturbances, or discharge  ENT:  No difficulty hearing, tinnitus, vertigo; No sinus or throat pain  NECK: No pain or stiffness  RESPIRATORY: No cough, wheezing, chills or hemoptysis; No Shortness of Breath  CARDIOVASCULAR: No chest pain, palpitations, passing out, dizziness, or leg swelling  GASTROINTESTINAL: No abdominal or epigastric pain. No nausea, vomiting, or hematemesis; No diarrhea or constipation. No melena or hematochezia.  GENITOURINARY: No dysuria, frequency, hematuria, or incontinence  NEUROLOGICAL: No headaches, memory loss, loss of strength, numbness, or tremors  SKIN: No itching, burning, rashes, or lesions   LYMPH Nodes: No enlarged glands  ENDOCRINE: No heat or cold intolerance; No hair loss  MUSCULOSKELETAL: No joint pain or swelling; No muscle, back, or extremity pain  PSYCHIATRIC: No depression, anxiety, mood swings, or difficulty sleeping  HEME/LYMPH: No easy bruising, or bleeding gums  ALLERGY AND IMMUNOLOGIC: No hives or eczema	      PHYSICAL EXAM:  T(C): 36.7 (10-25-19 @ 07:08), Max: 37.1 (10-24-19 @ 19:07)  HR: 84 (10-25-19 @ 07:08) (84 - 108)  BP: 90/63 (10-25-19 @ 07:08) (90/63 - 125/65)  RR: 18 (10-25-19 @ 07:08) (18 - 24)  SpO2: 94% (10-25-19 @ 07:08) (91% - 98%)  Wt(kg): --  I&O's Summary    24 Oct 2019 07:01  -  25 Oct 2019 07:00  --------------------------------------------------------  IN: 50 mL / OUT: 700 mL / NET: -650 mL        Appearance: Normal	  HEENT:   Normal oral mucosa, PERRL, EOMI	  Lymphatic: No lymphadenopathy  Cardiovascular: Normal S1 S2, No JVD, No murmurs, No edema  Respiratory: Lungs clear to auscultation	  Psychiatry: A & O x 3, Mood & affect appropriate  Gastrointestinal:  Soft, Non-tender, + BS	  Skin: No rashes, No ecchymoses, No cyanosis	  Neurologic: Non-focal  Extremities: Normal range of motion, No clubbing, cyanosis or edema  Vascular: Peripheral pulses palpable 2+ bilaterally      ECG:  	nsr,low voltage,q inferior leads    	  LABS:	 	      CARDIAC MARKERS ( 25 Oct 2019 06:34 )  0.288 ng/mL / x     / x     / x     / x      CARDIAC MARKERS ( 24 Oct 2019 21:21 )  0.495 ng/mL / x     / x     / x     / x      CARDIAC MARKERS ( 24 Oct 2019 15:28 )  0.455 ng/mL / x     / x     / x     / x      CARDIAC MARKERS ( 24 Oct 2019 10:28 )  0.438 ng/mL / x     / x     / x     / x                                  8.7    6.78  )-----------( 228      ( 25 Oct 2019 06:34 )             28.3     10-25    142  |  112<H>  |  27<H>  ----------------------------<  103<H>  4.3   |  23  |  1.07    Ca    9.1      25 Oct 2019 06:34  Phos  3.3     10-25  Mg     2.0     10-25    TPro  7.3  /  Alb  3.6  /  TBili  0.3  /  DBili  x   /  AST  13  /  ALT  21  /  AlkPhos  64  10-24      Lipid Profile: Cholesterol 180  LDL 92  HDL 37        TSH: Thyroid Stimulating Hormone, Serum: 0.47 uU/mL (10-25 @ 06:34)        EXAM:  XR CHEST PORTABLE URGENT 1V                            PROCEDURE DATE:  10/24/2019          INTERPRETATION:    DATE OF STUDY: 10/24/2019    PRIOR:5/12/19.    CLINICAL INDICATION: Shortness of breath. Rule out pneumonia.    TECHNIQUE: portable chest.    FINDINGS:   Study is limited by low lung volumes.  Thoracic aortic atheromatous changes and ectasia are stable  The heart is magnified by technique.  No focal consolidations. No pleural effusion or pneumothorax.  No acute bony findings.    IMPRESSION:   Stable chest.  No focal consolidations.    EXAM:  US KIDNEY(S)                            PROCEDURE DATE:  05/16/2019          INTERPRETATION:      CLINICAL STATEMENT: UTI    TECHNIQUE: Ultrasound of the kidneys.    COMPARISON: CT abdomen pelvis 5/14/2019    FINDINGS:  The right kidney measures 10.8 cm in length .    The left kidney measures 12.3 cm in length .    There is no hydronephrosis. Small renal cysts noted in the right kidney   measuring up to 2.8 cm.. There is calcification at the peripheral aspect   of one of the right renal cyst..    IMPRESSION:  No hydronephrosis.    Complex cyst in the right kidney    EXAM:  CT ABDOMEN AND PELVIS OC                            PROCEDURE DATE:  05/14/2019          INTERPRETATION:  CLINICAL HISTORY: 73 years  Female with anemia  anemia.    COMPARISON: None.    PROCEDURE:   CT of the Abdomen and Pelvis was performedwithout intravenous contrast.   Intravenous contrast: None.  Oral contrast: positive contrast was administered.  Sagittal and coronal reformats were performed.    LIMITATIONS: Evaluation of the solid organs is limited by lack of IV   contrast.    FINDINGS:    LOWER CHEST: Mild bibasilar dependent changes. Cardiomegaly. Coronary   artery calcifications.    LIVER: Within normal limits.  BILE DUCTS: Normal caliber.  GALLBLADDER: Not visualized  SPLEEN: Within normal limits. 1.5 cm splenule near the splenic tip.  PANCREAS: Atrophic pancreatic head, neck and body. Limited evaluation for   pancreatic tail mass.  ADRENALS: Within normal limits.  KIDNEYS/URETERS: 1.8 cm right upper pole renal cysts. 5 mm right upper   pole renal cyst to 6 lesion too small to characterize with certainty. 2.4   cm right midpole cyst. Right renal cortical atrophy. Mild nonspecific   bilateral perinephric inflammatory stranding. Infiltration of the left   renal hilar fat.    BLADDER: Within normal limits.  REPRODUCTIVE ORGANS: Unremarkable uterus.    BOWEL: No bowel obstruction. Descending and sigmoid colonic   diverticulosis without diverticulitis. Transverse colon within the   ventral hernia. At least 3 duodenal diverticula are present in the second   portion measuring up to 1.5 cm in diameter. Mild stranding of the   periduodenal fat. Cannot rule out ulcer. The appendix is not visualized   and cannot be assessed.The stomach is collapsed and cannot be assessed.  PERITONEUM: No ascites. No pneumoperitoneum.  VESSELS:  Within normal limits.  RETROPERITONEUM: No lymphadenopathy.    ABDOMINAL WALL: 9.7 cm supraumbilical hernia containing transverse colon.   No bowel obstruction.  BONES: Diffuse osteoporosis. Thoracic degenerative changes with mild   compression deformity of T12.    IMPRESSION:    At least 3 duodenal diverticula. Mild stranding of the paraduodenal fat.   Recommend endoscopy to exclude duodenal ulcer. The stomach is collapsed   and cannot be assessed.    Nonspecific bilateral perinephric inflammatory stranding, worse on the   left. Infiltration of the left neural hilar fat. Rule out pyelonephritis.   Consider CT urogram for additional diagnostic benefit.    9.7 cm supraumbilical ventral hernia containing transverse colon. No   bowel obstruction.    Fullness of the pancreatic tail may be related to disproportionate   atrophy of the remainder the pancreas. Cannot rule out underlying mass   without IV contrast.    Descending and sigmoid colonic diverticulosis without diverticulitis.    Cardiomegaly. Coronary artery disease.    Findings discussed with Dr. Tao at 5/14/2019 12:58 PM with readback.      Surgical Pathology Report (05.15.19 @ 16:00)    Surgical Pathology Report:   ACCESSION No:  [id number]    ARUNA HAY        Surgical Final Report          Final Diagnosis  1. Rectum, biopsy: Benign colonic mucosa with focal hyperplastic  change. No cryptitis or active inflammation is presnt.  2. Antrum, biopsy: Chronic gastritis with reactive epithelial  changes. Special stains for H pylori are negative    Verified by: Keyona Durán M.D.  (Electronic Signature)  Reported on: 05/17/19 13:33 EDT, [address] [place] [id number]  _________________________________________________________________    Clinical History  Rule out H. Pylori.  Rule out proctitis  EGD/Colon/Bx    Specimen(s) Submitted  1-Rectal bx  2-Antrum    Gross Description  1.   The specimen is received in formalin andthe specimen  container is labeled: Rectal biopsy.  It consists of one soft tan  fragment of tissue measuring 0.2 x 0.2 x 0.1 cm .  Entirely  submitted.  One cassette.    2.   The specimen is received in formalin and the specimen  container is labeled: Antrum biopsy.  It consists of one soft tan  fragment of tissue measuring 0.3 x 0.3 x 0.1 cm in greatest  dimension.  Entirely submitted.  One cassette.    In addition to other data that may appear on the specimen  containers, all labels have been inspected to confirm the  presence of the patient's name and date of birth.    Marilin CHANDRA 05/16/2019 09:36    OBSERVATIONS:  Mitral Valve: Normal mitral valve. Mild mitral  regurgitation.  Aortic Root: Normal aortic root.  Aortic Valve: Normal trileaflet aortic valve. Trace aortic  regurgitation.  Left Atrium: Normal left atrium.  LA volume index = 24  cc/m2.  Left Ventricle: Normal left ventricular systolic function.  Normal left ventricular internal dimensions and wall  thicknesses.  Right Heart: Normal right atrium. Normal right ventricular  size and function. There is mild tricuspid regurgitation.  There is trace pulmonic regurgitation.  Pericardium/PleuraNormal pericardium with no pericardial  effusion.  Hemodynamic: RA Pressure is 8 mm Hg. Incomplete tricuspid  regurgitation jet precludes accurate assessment of  pulmonary artery systolic pressure.
PULMONARY CONSULT NOTE      ARUNA HAY  MRN-[id number]    Patient is a 74y old  Female who presents with a chief complaint of generalized weakness (25 Oct 2019 09:24)      HISTORY OF PRESENT ILLNESS:  History of Present Illness:  Reason for Admission: generalised weakness	  History of Present Illness: 	  74 y/o F Eritrean speaking from home, walks with cane, no HHA ,lives with daughter with PMHx of HTN, HLD, CAD (1 stent in ), and arthritis presents to the ED with c/o worsening  generalized weakness x 1 day.  History obtained from the daughter at bedside as pt didnt wanted the  services. Since 1 week pt is easily getting fatigued, SOB on ambulation and is not waking as much as used to .Pt also reports mild chest discomfort on ambulation.Pt was last admitted with similar complaint in 2019 when she was found to have low Hb and was transfused 1 unit blood. EGD was done which showed   Gastrtitis,esophagitis. Colonoscopy showed Diverticulosis, internal hemorrhoids , Rectal Bx taken which came back normal. Pt denies any lightheadedness,CP  nausea, vomiting,fever , Cough, dark stools or other complaints.    ED course : Pt found to have Hb 8.1. Vitals stable . BUN/Cr 33/1.15 . ECG : NSR but elevated trop T1 0.438 T2 0.455 . ED contacted Dr Horvath who thinks elevated trops are from demand ischemia.     SH:  Denies Smoking, alcohol or drug use     Pt is awake, alert, lying in bed in NAD. Has hx of CHIQUITA but did not tolerate CPAP machine due to claustrophobia. Also refused oral appliance due to dentures. Pt has hx of Asthma, no recent PFTs.     MEDICATIONS  (STANDING):  aspirin  chewable 81 milliGRAM(s) Oral daily  atorvastatin 40 milliGRAM(s) Oral at bedtime  cefTRIAXone   IVPB      cefTRIAXone   IVPB 1000 milliGRAM(s) IV Intermittent once  enoxaparin Injectable 40 milliGRAM(s) SubCutaneous daily  losartan 25 milliGRAM(s) Oral daily  metoprolol tartrate 25 milliGRAM(s) Oral two times a day  oxybutynin XL 10 milliGRAM(s) Oral daily  pantoprazole  Injectable 40 milliGRAM(s) IV Push two times a day      MEDICATIONS  (PRN):      Allergies    No Known Allergies    Intolerances        PAST MEDICAL & SURGICAL HISTORY:  CAD (coronary artery disease)  HLD (hyperlipidemia)  S/P cardiac catheterization: stent x 1  History of hypertension  Arthritis  No significant past surgical history      FAMILY HISTORY:  No pertinent family history in first degree relatives      SOCIAL HISTORY  Smoking History:     REVIEW OF SYSTEMS:    CONSTITUTIONAL:  No fevers, chills, sweats    HEENT:  Eyes:  No diplopia or blurred vision. ENT:  No earache, sore throat or runny nose.    CARDIOVASCULAR:  No pressure, squeezing, tightness, or heaviness about the chest; no palpitations.    RESPIRATORY:  Per HPI    GASTROINTESTINAL:  No abdominal pain, nausea, vomiting or diarrhea.    GENITOURINARY:  No dysuria, frequency or urgency.    NEUROLOGIC:  No paresthesias, fasciculations, seizures or weakness.    PSYCHIATRIC:  No disorder of thought or mood.    Vital Signs Last 24 Hrs  T(C): 36.7 (25 Oct 2019 07:08), Max: 37.1 (24 Oct 2019 19:07)  T(F): 98.1 (25 Oct 2019 07:08), Max: 98.8 (24 Oct 2019 19:07)  HR: 84 (25 Oct 2019 07:08) (84 - 108)  BP: 90/63 (25 Oct 2019 07:08) (90/63 - 125/65)  BP(mean): --  RR: 18 (25 Oct 2019 07:08) (18 - 20)  SpO2: 94% (25 Oct 2019 07:08) (91% - 98%)  I&O's Detail    24 Oct 2019 07:01  -  25 Oct 2019 07:00  --------------------------------------------------------  IN:    Oral Fluid: 50 mL  Total IN: 50 mL    OUT:    Voided: 700 mL  Total OUT: 700 mL    Total NET: -650 mL          PHYSICAL EXAMINATION:    GENERAL: The patient is a well-developed, well-nourished no apparent distress.     HEENT: Head is normocephalic and atraumatic. Extraocular muscles are intact. Mucous membranes are moist.     NECK: Supple.     LUNGS: Clear to auscultation without wheezing, rales, or rhonchi. Respirations unlabored    HEART: Regular rate and rhythm without murmur.    ABDOMEN: Soft, nontender, and nondistended.  No hepatosplenomegaly is noted.    EXTREMITIES: Without any cyanosis, clubbing, rash, lesions or edema.    NEUROLOGIC: Grossly intact.      LABS:                        8.7    6.78  )-----------( 228      ( 25 Oct 2019 06:34 )             28.3     10-25    142  |  112<H>  |  27<H>  ----------------------------<  103<H>  4.3   |  23  |  1.07    Ca    9.1      25 Oct 2019 06:34  Phos  3.3     10-  Mg     2.0     10-    TPro  7.3  /  Alb  3.6  /  TBili  0.3  /  DBili  x   /  AST  13  /  ALT  21  /  AlkPhos  64  10-24    PT/INR - ( 24 Oct 2019 10:28 )   PT: 11.5 sec;   INR: 1.03 ratio           Urinalysis Basic - ( 25 Oct 2019 04:41 )    Color: Yellow / Appearance: Slightly Turbid / S.015 / pH: x  Gluc: x / Ketone: Negative  / Bili: Negative / Urobili: Negative   Blood: x / Protein: 30 mg/dL / Nitrite: Positive   Leuk Esterase: Moderate / RBC: 2-5 /HPF / WBC >50 /HPF   Sq Epi: x / Non Sq Epi: Few /HPF / Bacteria: Many /HPF        CARDIAC MARKERS ( 25 Oct 2019 06:34 )  0.288 ng/mL / x     / x     / x     / x      CARDIAC MARKERS ( 24 Oct 2019 21:21 )  0.495 ng/mL / x     / x     / x     / x      CARDIAC MARKERS ( 24 Oct 2019 15:28 )  0.455 ng/mL / x     / x     / x     / x      CARDIAC MARKERS ( 24 Oct 2019 10:28 )  0.438 ng/mL / x     / x     / x     / x                    MICROBIOLOGY:    RADIOLOGY & ADDITIONAL STUDIES:    CXR:    < from: Xray Chest 1 View- PORTABLE-Urgent (10.24.19 @ 18:38) >    IMPRESSION:   Stable chest.  No focal consolidations.    < end of copied text >  Ct scan chest:    ekg;    echo:

## 2019-10-27 NOTE — CONSULT NOTE ADULT - NEGATIVE GASTROINTESTINAL SYMPTOMS
no diarrhea/no melena/no change in bowel habits/no jaundice/no vomiting/no hematochezia/no nausea/no abdominal pain

## 2019-10-27 NOTE — PROGRESS NOTE ADULT - SUBJECTIVE AND OBJECTIVE BOX
Patient is awake, alert, lying in bed in NAD.     INTERVAL HPI/OVERNIGHT EVENTS:      VITAL SIGNS:  T(F): 98.3 (10-27-19 @ 11:48)  HR: 90 (10-27-19 @ 11:48)  BP: 114/65 (10-27-19 @ 11:48)  RR: 18 (10-27-19 @ 11:48)  SpO2: 98% (10-27-19 @ 11:48)  Wt(kg): --  I&O's Detail    26 Oct 2019 07:01  -  27 Oct 2019 07:00  --------------------------------------------------------  IN:  Total IN: 0 mL    OUT:    Voided: 400 mL  Total OUT: 400 mL    Total NET: -400 mL              REVIEW OF SYSTEMS:    CONSTITUTIONAL:  No fevers, chills, sweats    HEENT:  Eyes:  No diplopia or blurred vision. ENT:  No earache, sore throat or runny nose.    CARDIOVASCULAR:  No pressure, squeezing, tightness, or heaviness about the chest; no palpitations.    RESPIRATORY:  Per HPI    GASTROINTESTINAL:  No abdominal pain, nausea, vomiting or diarrhea.    GENITOURINARY:  No dysuria, frequency or urgency.    NEUROLOGIC:  No paresthesias, fasciculations, seizures or weakness.    PSYCHIATRIC:  No disorder of thought or mood.      PHYSICAL EXAM:    Constitutional: Well developed and nourished  Eyes:Perrla  ENMT: normal  Neck:supple  Respiratory: good air entry  Cardiovascular: S1 S2 regular  Gastrointestinal: Soft, Non tender  Extremities: No edema  Vascular:normal  Neurological:Awake, alert,Ox3  Musculoskeletal:Normal      MEDICATIONS  (STANDING):  aspirin  chewable 81 milliGRAM(s) Oral daily  atorvastatin 40 milliGRAM(s) Oral at bedtime  cefTRIAXone   IVPB      cefTRIAXone   IVPB 1000 milliGRAM(s) IV Intermittent every 24 hours  enoxaparin Injectable 40 milliGRAM(s) SubCutaneous daily  losartan 25 milliGRAM(s) Oral daily  metoprolol tartrate 25 milliGRAM(s) Oral two times a day  oxybutynin XL 10 milliGRAM(s) Oral daily  pantoprazole  Injectable 40 milliGRAM(s) IV Push two times a day    MEDICATIONS  (PRN):      Allergies    No Known Allergies    Intolerances        LABS:                        8.4    6.46  )-----------( 228      ( 27 Oct 2019 07:05 )             27.4     10-27    144  |  114<H>  |  25<H>  ----------------------------<  97  4.2   |  23  |  1.07    Ca    9.3      27 Oct 2019 07:05  Phos  3.7     10-27  Mg     2.1     10-27    TPro  6.4  /  Alb  x   /  TBili  x   /  DBili  x   /  AST  x   /  ALT  x   /  AlkPhos  x   10-25              CAPILLARY BLOOD GLUCOSE            RADIOLOGY & ADDITIONAL TESTS:    CXR:  < from: Xray Chest 1 View- PORTABLE-Urgent (10.24.19 @ 18:38) >  IMPRESSION:   Stable chest.  No focal consolidations.    < end of copied text >    Ct scan chest:  < from: CT Abdomen and Pelvis No Cont (10.26.19 @ 13:31) >  IMPRESSION:     Stable from 5/14 appearance of the pancreatic tail as described, no   compelling mass.    Air in the endometrial cavity, new from 5/14. No compelling fistula.   Correlate for recent instrumentation, endometritis. Other findings as   above, stable from 5/14.    < end of copied text >    ekg;    echo:

## 2019-10-27 NOTE — PROGRESS NOTE ADULT - SUBJECTIVE AND OBJECTIVE BOX
PGY1 Note discussed with supervising resident and primary attending.    Patient is a 74y old  Female who presents with a chief complaint of generalised weakness (26 Oct 2019 11:04)    INTERVAL HPI/OVERNIGHT EVENTS:  No acute events occurred overnight  Patient denies respiratory distress, confusion, and light-headedness  Patient is tolerating a normal diet    MEDICATIONS  (STANDING):  aspirin  chewable 81 milliGRAM(s) Oral daily  atorvastatin 40 milliGRAM(s) Oral at bedtime  cefTRIAXone   IVPB      cefTRIAXone   IVPB 1000 milliGRAM(s) IV Intermittent every 24 hours  enoxaparin Injectable 40 milliGRAM(s) SubCutaneous daily  losartan 25 milliGRAM(s) Oral daily  metoprolol tartrate 25 milliGRAM(s) Oral two times a day  oxybutynin XL 10 milliGRAM(s) Oral daily  pantoprazole  Injectable 40 milliGRAM(s) IV Push two times a day    MEDICATIONS  (PRN):      Allergies    No Known Allergies    Intolerances    REVIEW OF SYSTEMS:  CONSTITUTIONAL: No fever, weight loss, or fatigue  RESPIRATORY: No cough, wheezing, chills or hemoptysis; No shortness of breath  CARDIOVASCULAR: No chest pain, palpitations, dizziness, or leg swelling  GASTROINTESTINAL: No abdominal or epigastric pain. No nausea, vomiting, or hematemesis; No diarrhea or constipation. No melena or hematochezia.  NEUROLOGICAL: No headaches, memory loss, loss of strength, numbness, or tremors  SKIN: No itching, burning, rashes, or lesions     Vital Signs Last 24 Hrs  T(C): 36.8 (27 Oct 2019 07:39), Max: 36.8 (26 Oct 2019 23:35)  T(F): 98.2 (27 Oct 2019 07:39), Max: 98.3 (26 Oct 2019 23:35)  HR: 86 (27 Oct 2019 07:39) (85 - 96)  BP: 131/74 (27 Oct 2019 07:39) (100/68 - 134/86)  BP(mean): --  RR: 18 (27 Oct 2019 07:39) (17 - 18)  SpO2: 96% (27 Oct 2019 07:39) (95% - 97%)    PHYSICAL EXAM:  GENERAL: NAD, well-groomed, well-developed  HEAD:  Atraumatic, Normocephalic  EYES: EOMI, PERRLA, conjunctiva and sclera clear  NECK: Supple, No JVD, Normal thyroid  CHEST/LUNG: Clear to percussion bilaterally; No rales, rhonchi, wheezing, or rubs  HEART: Regular rate and rhythm; No murmurs, rubs, or gallops  ABDOMEN: Soft, Nontender, Nondistended; Bowel sounds present  NERVOUS SYSTEM:  Alert & Oriented X3, Good concentration; Motor Strength 5/5 B/L   EXTREMITIES:  2+ Peripheral Pulses, No clubbing, cyanosis, or edema  SKIN;    LABS:                        8.4    6.46  )-----------( 228      ( 27 Oct 2019 07:05 )             27.4     10-27    144  |  114<H>  |  25<H>  ----------------------------<  97  4.2   |  23  |  1.07    Ca    9.3      27 Oct 2019 07:05  Phos  3.7     10-27  Mg     2.1     10-27    TPro  6.4  /  Alb  x   /  TBili  x   /  DBili  x   /  AST  x   /  ALT  x   /  AlkPhos  x   10-25    CAPILLARY BLOOD GLUCOSE    RADIOLOGY & ADDITIONAL TESTS:    CT Abdomen and Pelvis    IMPRESSION:     Stable from 5/14 appearance of the pancreatic tail as described, no   compelling mass.    Air in the endometrial cavity, new from 5/14. No compelling fistula.   Correlate for recent instrumentation, endometritis. Other findings as   above, stable from 5/14.    NIXON FRAZIER M.D., ATTENDING RADIOLOGIST  This document has been electronically signed. Oct 26 2019  2:22PM    Imaging Personally Reviewed:  [ ] YES  [ ] NO    Consultant(s) Notes Reviewed:  [ ] YES  [ ] NO PGY1 Note discussed with supervising resident and primary attending.    Patient is a 74y old  Female who presents with a chief complaint of generalised weakness (26 Oct 2019 11:04)    INTERVAL HPI/OVERNIGHT EVENTS:  No acute events occurred overnight  Patient denies respiratory distress, confusion, and light-headedness  Patient is tolerating a normal diet    MEDICATIONS  (STANDING):  aspirin  chewable 81 milliGRAM(s) Oral daily  atorvastatin 40 milliGRAM(s) Oral at bedtime  cefTRIAXone   IVPB      cefTRIAXone   IVPB 1000 milliGRAM(s) IV Intermittent every 24 hours  enoxaparin Injectable 40 milliGRAM(s) SubCutaneous daily  losartan 25 milliGRAM(s) Oral daily  metoprolol tartrate 25 milliGRAM(s) Oral two times a day  oxybutynin XL 10 milliGRAM(s) Oral daily  pantoprazole  Injectable 40 milliGRAM(s) IV Push two times a day    MEDICATIONS  (PRN):    Allergies    No Known Allergies    Intolerances    REVIEW OF SYSTEMS:  CONSTITUTIONAL: No fever, weight loss, or fatigue  RESPIRATORY: No cough, wheezing, chills or hemoptysis; No shortness of breath  CARDIOVASCULAR: No chest pain, palpitations, dizziness, or leg swelling  GASTROINTESTINAL: No abdominal or epigastric pain. No nausea, vomiting, or hematemesis; No diarrhea or constipation. No melena or hematochezia.  NEUROLOGICAL: No headaches, memory loss, loss of strength, numbness, or tremors  SKIN: No itching, burning, rashes, or lesions     Vital Signs Last 24 Hrs  T(C): 36.8 (27 Oct 2019 07:39), Max: 36.8 (26 Oct 2019 23:35)  T(F): 98.2 (27 Oct 2019 07:39), Max: 98.3 (26 Oct 2019 23:35)  HR: 86 (27 Oct 2019 07:39) (85 - 96)  BP: 131/74 (27 Oct 2019 07:39) (100/68 - 134/86)  BP(mean): --  RR: 18 (27 Oct 2019 07:39) (17 - 18)  SpO2: 96% (27 Oct 2019 07:39) (95% - 97%)    PHYSICAL EXAM:  GENERAL: NAD, well-groomed, well-developed  HEAD:  Atraumatic, Normocephalic  EYES: EOMI, PERRLA, conjunctiva and sclera clear  NECK: Supple, No JVD, Normal thyroid  CHEST/LUNG: Clear to percussion bilaterally; No rales, rhonchi, wheezing, or rubs  HEART: Regular rate and rhythm; No murmurs, rubs, or gallops  ABDOMEN: Soft, Nontender, Nondistended; Bowel sounds present  NERVOUS SYSTEM:  Alert & Oriented X3, Good concentration; Motor Strength 5/5 B/L   EXTREMITIES:  2+ Peripheral Pulses, No clubbing, cyanosis, or edema  SKIN;    LABS:                        8.4    6.46  )-----------( 228      ( 27 Oct 2019 07:05 )             27.4     10-27    144  |  114<H>  |  25<H>  ----------------------------<  97  4.2   |  23  |  1.07    Ca    9.3      27 Oct 2019 07:05  Phos  3.7     10-27  Mg     2.1     10-27    TPro  6.4  /  Alb  x   /  TBili  x   /  DBili  x   /  AST  x   /  ALT  x   /  AlkPhos  x   10-25    CAPILLARY BLOOD GLUCOSE    RADIOLOGY & ADDITIONAL TESTS:    CT Abdomen and Pelvis    IMPRESSION:     Stable from 5/14 appearance of the pancreatic tail as described, no   compelling mass.    Air in the endometrial cavity, new from 5/14. No compelling fistula.   Correlate for recent instrumentation, endometritis. Other findings as   above, stable from 5/14.    NIXON FRAZIER M.D., ATTENDING RADIOLOGIST  This document has been electronically signed. Oct 26 2019  2:22PM    Imaging Personally Reviewed:  [ ] YES  [ ] NO    Consultant(s) Notes Reviewed:  [ ] YES  [ ] NO

## 2019-10-27 NOTE — CONSULT NOTE ADULT - RS GEN PE MLT RESP DETAILS PC
----- Message from Mar Solitario sent at 12/20/2018  8:02 AM PST -----  Regarding: RX REFILL  Patient came in wants a refill on meloxicam (MOBIC) 7.5 MG Tab [[id number]] but for 30 days   
Was the patient seen in the last year in this department? Yes    Does patient have an active prescription for medications requested? No     Received Request Via: Patient  
breath sounds equal/airway patent/good air movement

## 2019-10-27 NOTE — CONSULT NOTE ADULT - HEMATOLOGY/LYMPHATICS
Is This A New Presentation, Or A Follow-Up?: Skin Lesions
How Severe Is Your Skin Lesion?: mild
Have Your Skin Lesions Been Treated?: not been treated
Additional History: ESPECIALLY SPOT ON LT UPPER ARM
details…

## 2019-10-27 NOTE — PROGRESS NOTE ADULT - SUBJECTIVE AND OBJECTIVE BOX
Patient is a 74y old  Female who presents with a chief complaint of generalised weakness (26 Oct 2019 11:04)    pt seen in tele [ x ], reg med floor [   ], bed [x  ], chair at bedside [   ], a+o x3 [ x ], lethargic [  ],  nad [ x ]      Allergies    No Known Allergies        Vitals    T(F): 98.2 (10-27-19 @ 07:39), Max: 98.3 (10-26-19 @ 23:35)  HR: 86 (10-27-19 @ 07:39) (85 - 96)  BP: 131/74 (10-27-19 @ 07:39) (100/68 - 134/86)  RR: 18 (10-27-19 @ 07:39) (17 - 18)  SpO2: 96% (10-27-19 @ 07:39) (95% - 97%)  Wt(kg): --  CAPILLARY BLOOD GLUCOSE          Labs                          8.4    6.46  )-----------( 228      ( 27 Oct 2019 07:05 )             27.4       10-27    144  |  114<H>  |  25<H>  ----------------------------<  97  4.2   |  23  |  1.07    Ca    9.3      27 Oct 2019 07:05  Phos  3.7     10-27  Mg     2.1     10-27    TPro  6.4  /  Alb  x   /  TBili  x   /  DBili  x   /  AST  x   /  ALT  x   /  AlkPhos  x   10-25    Occult Blood, Feces: Negative (10.26.19 @ 11:30)    Immunoglobulin Free Light Chains, Serum (10.26.19 @ 00:48)    YULI Kappa: 8.76 mg/dL    YULI Lambda: 1.33 mg/dL    Kappa/Lambda Free Light Chain Ratio, Serum: 6.59 Ratio            .Urine  10-26 @ 10:16   <10,000 CFU/mL Normal Urogenital Dipika  --  --    < from: Transthoracic Echocardiogram (05.14.19 @ 07:37) >  CONCLUSIONS:  1. Mild mitral regurgitation.  2.  Trace aortic regurgitation.  3. Normal left ventricular internal dimensions and wall  thicknesses.  4. Normal left ventricular systolic function.  5. Normal right ventricular size and function.  Ejection Fraction Visual Estimate: >55 %    < end of copied text >        Radiology Results      Meds    MEDICATIONS  (STANDING):  aspirin  chewable 81 milliGRAM(s) Oral daily  atorvastatin 40 milliGRAM(s) Oral at bedtime  cefTRIAXone   IVPB      cefTRIAXone   IVPB 1000 milliGRAM(s) IV Intermittent every 24 hours  enoxaparin Injectable 40 milliGRAM(s) SubCutaneous daily  losartan 25 milliGRAM(s) Oral daily  metoprolol tartrate 25 milliGRAM(s) Oral two times a day  oxybutynin XL 10 milliGRAM(s) Oral daily  pantoprazole  Injectable 40 milliGRAM(s) IV Push two times a day      MEDICATIONS  (PRN):      Physical Exam      Neuro :  no focal deficits  Respiratory: CTA B/L  CV: RRR, S1S2, no murmurs,   Abdominal: Soft, NT, ND +BS,  Extremities: No edema, + peripheral pulses    ASSESSMENT    symptomatic anemia  elevated trop likely 2nd to demand ischemia  r/o acs   uti  h/o CAD (coronary artery disease)  HLD (hyperlipidemia)  S/P cardiac catheterization  History of hypertension  shubham  Arthritis        PLAN    cont tele,   acs protocol,   cont lopressor, aspirin, statin,   cardio f/u  ce q8 x 4 abnormal noted above  echo 5/19 with lvef >55%  s/p 1 prbc  stool occult blood neg noted above  gi cons   anemia panel  vit b12 [facility] wnl  heme onc f/u  direct shaunna neg noted   hepatitis c neg noted   TSH wnl noted above  spep with elevated kappa light chains noted above  cont rocephin to complete 5 days  ucx normal dipika noted above   pulm f/u  Noncompliant with CPAP use  Will further discuss use of oral appliance as OP  PSG's as OP    phys tx eval  cont current meds

## 2019-10-27 NOTE — PROGRESS NOTE ADULT - SUBJECTIVE AND OBJECTIVE BOX
CHIEF COMPLAINT:Patient is a 74y old  Female who presents with a chief complaint of Generalized Weakness.Pt appears comfortable.    	  REVIEW OF SYSTEMS:  CONSTITUTIONAL: No fever, weight loss, or fatigue  EYES: No eye pain, visual disturbances, or discharge  ENT:  No difficulty hearing, tinnitus, vertigo; No sinus or throat pain  NECK: No pain or stiffness  RESPIRATORY: No cough, wheezing, chills or hemoptysis; No Shortness of Breath  CARDIOVASCULAR: No chest pain, palpitations, passing out, dizziness, or leg swelling  GASTROINTESTINAL: No abdominal or epigastric pain. No nausea, vomiting, or hematemesis; No diarrhea or constipation. No melena or hematochezia.  GENITOURINARY: No dysuria, frequency, hematuria, or incontinence  NEUROLOGICAL: No headaches, memory loss, loss of strength, numbness, or tremors  SKIN: No itching, burning, rashes, or lesions   LYMPH Nodes: No enlarged glands  ENDOCRINE: No heat or cold intolerance; No hair loss  MUSCULOSKELETAL: No joint pain or swelling; No muscle, back, or extremity pain  PSYCHIATRIC: No depression, anxiety, mood swings, or difficulty sleeping  HEME/LYMPH: No easy bruising, or bleeding gums  ALLERGY AND IMMUNOLOGIC: No hives or eczema	      PHYSICAL EXAM:  T(C): 36.8 (10-27-19 @ 07:39), Max: 36.8 (10-26-19 @ 23:35)  HR: 86 (10-27-19 @ 07:39) (85 - 96)  BP: 131/74 (10-27-19 @ 07:39) (100/68 - 134/86)  RR: 18 (10-27-19 @ 07:39) (17 - 18)  SpO2: 96% (10-27-19 @ 07:39) (95% - 97%)  Wt(kg): --  I&O's Summary    26 Oct 2019 07:01  -  27 Oct 2019 07:00  --------------------------------------------------------  IN: 0 mL / OUT: 400 mL / NET: -400 mL        Appearance: Normal	  HEENT:   Normal oral mucosa, PERRL, EOMI	  Lymphatic: No lymphadenopathy  Cardiovascular: Normal S1 S2, No JVD, No murmurs, No edema  Respiratory: Lungs clear to auscultation	  Psychiatry: A & O x 3, Mood & affect appropriate  Gastrointestinal:  Soft, Non-tender, + BS	  Skin: No rashes, No ecchymoses, No cyanosis	  Neurologic: Non-focal  Extremities: Normal range of motion, No clubbing, cyanosis or edema  Vascular: Peripheral pulses palpable 2+ bilaterally    MEDICATIONS  (STANDING):  aspirin  chewable 81 milliGRAM(s) Oral daily  atorvastatin 40 milliGRAM(s) Oral at bedtime  cefTRIAXone   IVPB      cefTRIAXone   IVPB 1000 milliGRAM(s) IV Intermittent every 24 hours  enoxaparin Injectable 40 milliGRAM(s) SubCutaneous daily  losartan 25 milliGRAM(s) Oral daily  metoprolol tartrate 25 milliGRAM(s) Oral two times a day  oxybutynin XL 10 milliGRAM(s) Oral daily  pantoprazole  Injectable 40 milliGRAM(s) IV Push two times a day      LABS:	 	                       8.4    6.46  )-----------( 228      ( 27 Oct 2019 07:05 )             27.4     10-27    144  |  114<H>  |  25<H>  ----------------------------<  97  4.2   |  23  |  1.07    Ca    9.3      27 Oct 2019 07:05  Phos  3.7     10-27  Mg     2.1     10-27    TPro  6.4  /  Alb  x   /  TBili  x   /  DBili  x   /  AST  x   /  ALT  x   /  AlkPhos  x   10-25      Lipid Profile: Cholesterol 180  LDL 92  HDL 37      HgA1c: Hemoglobin A1C, Whole Blood: 5.9 % (10-25 @ 10:11)    TSH: Thyroid Stimulating Hormone, Serum: 0.68 uU/mL (10-26 @ 07:23)  Thyroid Stimulating Hormone, Serum: 0.47 uU/mL (10-25 @ 06:34)      	  EXAM:  CT ABDOMEN AND PELVIS                            PROCEDURE DATE:  10/26/2019          INTERPRETATION:  Clinical information: Pancreatic tail mass    Comparison: 5/14/2019    PROCEDURE:     CT of the Abdomen and Pelvis was performed without intravenous contrast.    Evaluation of abdominal and pelvic viscera, lymph nodes and vasculature   is limited without intravenous contrast.    FINDINGS:    LOWER CHEST: within normal limits    ABDOMEN:  LIVER: within normal limits  BILE DUCTS: normal caliber  GALLBLADDER: no calcified gallstones. normal caliber wall  PANCREAS: No mass or ductal dilatation. Fatty atrophy of the proximal   pancreas with sparing of the tail. Overall stable from 5/14/2019.  SPLEEN: within normal limits  ADRENALS: within normal limits  KIDNEYS: Bilateral cysts and hypodensities small to characterize. Mild   left-sided pelviectasis and inflammatory changes in bilateral renal   pelvis, stable from 5/14.    PELVIS:  REPRODUCTIVE ORGANS: Air in the endometrial cavity, new from 5/14. No   compelling fistula.  BLADDER: within normal limits    BOWEL: Sigmoid diverticulosis without diverticulitis. Normal appendix.  PERITONEUM: no ascites or free air, no fluid collection  RETROPERITONEUM: no enlarged retroperitoneal or pelvic nodes.    VESSELS: within normal limits for a noncontrast study.  ABDOMINAL WALL: Nonobstructed bowel containing ventral hernia..  MUSCULOSKELETAL: within normal limits.    IMPRESSION:     Stable from 5/14 appearance of the pancreatic tail as described, no   compelling mass.    Air in the endometrial cavity, new from 5/14. No compelling fistula.   Correlate for recent instrumentation, endometritis. Other findings as   above, stable from 5/14.

## 2019-10-28 NOTE — PROGRESS NOTE ADULT - PROVIDER SPECIALTY LIST ADULT
Cardiology
Cardiology
Heme/Onc
Internal Medicine
Pulmonology
Internal Medicine
Cardiology
Internal Medicine
Internal Medicine
Gastroenterology

## 2019-10-28 NOTE — PROGRESS NOTE ADULT - SUBJECTIVE AND OBJECTIVE BOX
PGY1 Note discussed with supervising resident and primary attending.    Patient is a 74y old  Female who presents with a chief complaint of generalised weakness (28 Oct 2019 14:47)    INTERVAL HPI / OVERNIGHT EVENTS:  No acute events occurred overnight  Patient has improved significantly from her symptoms  Patient is in bed watching videos and songs on her phone calmly  Patient continues to be hemodynamically stable and afebrile  Hemoglobin continues to stabilize  Patient is tolerating a normal diet, producing urine, and having normal bowel habits  Patient is denying headaches, confusion, nausea, vomiting, and dysuria    MEDICATIONS  (STANDING):  aspirin  chewable 81 milliGRAM(s) Oral daily  atorvastatin 40 milliGRAM(s) Oral at bedtime  cefTRIAXone   IVPB      cefTRIAXone   IVPB 1000 milliGRAM(s) IV Intermittent every 24 hours  enoxaparin Injectable 40 milliGRAM(s) SubCutaneous daily  metoprolol succinate ER 12.5 milliGRAM(s) Oral daily  montelukast 10 milliGRAM(s) Oral at bedtime  oxybutynin XL 10 milliGRAM(s) Oral daily  pantoprazole  Injectable 40 milliGRAM(s) IV Push two times a day    MEDICATIONS  (PRN):    Allergies    No Known Allergies    Intolerances    REVIEW OF SYSTEMS:  CONSTITUTIONAL: No fever, weight loss, or fatigue  RESPIRATORY: No cough, wheezing, chills or hemoptysis; No shortness of breath  CARDIOVASCULAR: No chest pain, palpitations, dizziness, or leg swelling  GASTROINTESTINAL: No abdominal or epigastric pain. No nausea, vomiting, or hematemesis; No diarrhea or constipation. No melena or hematochezia.  NEUROLOGICAL: No headaches, memory loss, loss of strength, numbness, or tremors  SKIN: No itching, burning, rashes, or lesions     Vital Signs Last 24 Hrs  T(C): 36.7 (28 Oct 2019 11:05), Max: 36.8 (27 Oct 2019 20:09)  T(F): 98.1 (28 Oct 2019 11:05), Max: 98.2 (27 Oct 2019 20:09)  HR: 92 (28 Oct 2019 11:05) (82 - 101)  BP: 107/63 (28 Oct 2019 11:05) (98/63 - 124/78)  BP(mean): --  RR: 18 (28 Oct 2019 11:05) (17 - 18)  SpO2: 96% (28 Oct 2019 11:05) (91% - 97%)    PHYSICAL EXAM:  GENERAL: NAD, well-groomed, well-developed  HEAD:  Atraumatic, Normocephalic  EYES: EOMI, PERRLA, conjunctiva and sclera clear  NECK: Supple, No JVD, Normal thyroid  CHEST/LUNG: Clear to percussion bilaterally; No rales, rhonchi, wheezing, or rubs  HEART: Regular rate and rhythm; No murmurs, rubs, or gallops  ABDOMEN: Soft, Nontender, Nondistended; Bowel sounds present  NERVOUS SYSTEM:  Alert & Oriented X3, Good concentration; Motor Strength 5/5 B/L   EXTREMITIES:  2+ Peripheral Pulses, No clubbing, cyanosis, or edema    LABS:                        8.9    6.41  )-----------( 225      ( 28 Oct 2019 07:42 )             29.4     10-28    141  |  110<H>  |  23<H>  ----------------------------<  94  4.1   |  25  |  1.03    Ca    9.0      28 Oct 2019 07:42  Phos  3.8     10-28  Mg     2.2     10-28    CAPILLARY BLOOD GLUCOSE    RADIOLOGY & ADDITIONAL TESTS:    Imaging Personally Reviewed:  [ ] YES  [ ] NO    Consultant(s) Notes Reviewed:  [ ] YES  [ ] NO

## 2019-10-28 NOTE — PROGRESS NOTE ADULT - SUBJECTIVE AND OBJECTIVE BOX
Patient is a 74y old  Female who presents with a chief complaint of generalized weakness (28 Oct 2019 09:38)    pt seen in icu [  ], reg med floor [   ], bed [  ], chair at bedside [   ], a+o x3 [  ], lethargic [  ],  nad [  ]    cowart [  ], ngt [  ], peg [  ], et tube [  ], cent line [  ], picc line [  ]        Allergies    No Known Allergies        Vitals    T(F): 97.6 (10-28-19 @ 07:45), Max: 98.3 (10-27-19 @ 11:48)  HR: 91 (10-28-19 @ 07:45) (82 - 101)  BP: 103/66 (10-28-19 @ 07:45) (98/63 - 124/78)  RR: 18 (10-28-19 @ 07:45) (17 - 18)  SpO2: 95% (10-28-19 @ 07:45) (91% - 98%)  Wt(kg): --  CAPILLARY BLOOD GLUCOSE          Labs                          8.9    6.41  )-----------( 225      ( 28 Oct 2019 07:42 )             29.4       10-28    141  |  110<H>  |  23<H>  ----------------------------<  94  4.1   |  25  |  1.03    Ca    9.0      28 Oct 2019 07:42  Phos  3.8     10-28  Mg     2.2     10-28              .Urine  10-26 @ 10:16   <10,000 CFU/mL Normal Urogenital Shandra  --  --          Radiology Results      Meds    MEDICATIONS  (STANDING):  aspirin  chewable 81 milliGRAM(s) Oral daily  atorvastatin 40 milliGRAM(s) Oral at bedtime  cefTRIAXone   IVPB      cefTRIAXone   IVPB 1000 milliGRAM(s) IV Intermittent every 24 hours  enoxaparin Injectable 40 milliGRAM(s) SubCutaneous daily  metoprolol succinate ER 12.5 milliGRAM(s) Oral daily  oxybutynin XL 10 milliGRAM(s) Oral daily  pantoprazole  Injectable 40 milliGRAM(s) IV Push two times a day      MEDICATIONS  (PRN):      Physical Exam    Neuro :  no focal deficits  Respiratory: CTA B/L  CV: RRR, S1S2, no murmurs,   Abdominal: Soft, NT, ND +BS,  Extremities: No edema, + peripheral pulses    ASSESSMENT    Other specified abnormal findings of blood chemistry  CAD (coronary artery disease)  HLD (hyperlipidemia)  S/P cardiac catheterization  History of hypertension  Arthritis  No significant past surgical history      PLAN Patient is a 74y old  Female who presents with a chief complaint of generalized weakness (28 Oct 2019 09:38)    pt seen in tele [ x ], reg med floor [   ], bed [x  ], chair at bedside [   ], a+o x3 [ x ], lethargic [  ],  nad [ x ]      Allergies    No Known Allergies        Vitals    T(F): 97.6 (10-28-19 @ 07:45), Max: 98.3 (10-27-19 @ 11:48)  HR: 91 (10-28-19 @ 07:45) (82 - 101)  BP: 103/66 (10-28-19 @ 07:45) (98/63 - 124/78)  RR: 18 (10-28-19 @ 07:45) (17 - 18)  SpO2: 95% (10-28-19 @ 07:45) (91% - 98%)  Wt(kg): --  CAPILLARY BLOOD GLUCOSE          Labs                          8.9    6.41  )-----------( 225      ( 28 Oct 2019 07:42 )             29.4       10-28    141  |  110<H>  |  23<H>  ----------------------------<  94  4.1   |  25  |  1.03    Ca    9.0      28 Oct 2019 07:42  Phos  3.8     10-28  Mg     2.2     10-28              .Urine  10-26 @ 10:16   <10,000 CFU/mL Normal Urogenital Dipika  --  --          Radiology Results      Meds    MEDICATIONS  (STANDING):  aspirin  chewable 81 milliGRAM(s) Oral daily  atorvastatin 40 milliGRAM(s) Oral at bedtime  cefTRIAXone   IVPB      cefTRIAXone   IVPB 1000 milliGRAM(s) IV Intermittent every 24 hours  enoxaparin Injectable 40 milliGRAM(s) SubCutaneous daily  metoprolol succinate ER 12.5 milliGRAM(s) Oral daily  oxybutynin XL 10 milliGRAM(s) Oral daily  pantoprazole  Injectable 40 milliGRAM(s) IV Push two times a day      MEDICATIONS  (PRN):      Physical Exam      Neuro :  no focal deficits  Respiratory: CTA B/L  CV: RRR, S1S2, no murmurs,   Abdominal: Soft, NT, ND +BS,  Extremities: No edema, + peripheral pulses    ASSESSMENT    symptomatic anemia  elevated trop likely 2nd to demand ischemia  acs r/o  uti  h/o CAD (coronary artery disease)  HLD (hyperlipidemia)  S/P cardiac catheterization  History of hypertension  shubham  Arthritis        PLAN    d/c tele,   cont lopressor, aspirin, statin,   cardio f/u  ce q8 x 4 abnormal noted above  echo 5/19 with lvef >55%  s/p 1 prbc  stool occult blood neg noted above  gi f/u   Consider capsule endoscopy  anemia panel  vit b12 levs wnl  heme onc f/u  direct shaunna neg noted   hepatitis c neg noted   TSH wnl noted above  spep with elevated kappa light chains noted above  cont rocephin to complete 5 days  ucx normal dipika noted above   pulm f/u  Noncompliant with CPAP use  Will further discuss use of oral appliance as OP  PSG's as OP    phys tx eval  cont current meds   d/c plan once cleared by heme-onc

## 2019-10-28 NOTE — PROGRESS NOTE ADULT - PROBLEM SELECTOR PLAN 4
BUN:Cr (33:1.15)  Will correct with oral hydration

## 2019-10-28 NOTE — PROGRESS NOTE ADULT - NEGATIVE GASTROINTESTINAL SYMPTOMS
no abdominal pain/no hematochezia/no change in bowel habits/no melena/no jaundice/no vomiting/no diarrhea/no nausea

## 2019-10-28 NOTE — PROGRESS NOTE ADULT - SUBJECTIVE AND OBJECTIVE BOX
fel fine  no sob, abd pain, no gross bleeding    MEDICATIONS  (STANDING):  aspirin  chewable 81 milliGRAM(s) Oral daily  atorvastatin 40 milliGRAM(s) Oral at bedtime  cefTRIAXone   IVPB      cefTRIAXone   IVPB 1000 milliGRAM(s) IV Intermittent every 24 hours  enoxaparin Injectable 40 milliGRAM(s) SubCutaneous daily  metoprolol succinate ER 12.5 milliGRAM(s) Oral daily  montelukast 10 milliGRAM(s) Oral at bedtime  oxybutynin XL 10 milliGRAM(s) Oral daily  pantoprazole   Suspension 40 milliGRAM(s) Oral daily    MEDICATIONS  (PRN):      Allergies    No Known Allergies    Intolerances        Vital Signs Last 24 Hrs  T(C): 36.4 (28 Oct 2019 16:03), Max: 36.8 (28 Oct 2019 04:28)  T(F): 97.5 (28 Oct 2019 16:03), Max: 98.2 (28 Oct 2019 04:28)  HR: 95 (28 Oct 2019 16:03) (82 - 95)  BP: 115/70 (28 Oct 2019 16:03) (98/63 - 115/70)  BP(mean): --  RR: 18 (28 Oct 2019 16:03) (17 - 18)  SpO2: 100% (28 Oct 2019 16:03) (91% - 100%)    PHYSICAL EXAM  General: adult in NAD  HEENT: clear oropharynx, anicteric sclera, pink conjunctiva  Neck: supple  CV: normal S1/S2 with no murmur rubs or gallops  Lungs: positive air movement b/l ant lungs,clear to auscultation, no wheezes, no rales  Abdomen: soft non-tender non-distended, no hepatosplenomegaly  Ext: no clubbing cyanosis or edema  Skin: no rashes and no petechiae  Neuro: alert and oriented X 4, no focal deficits  LABS:                          8.9    6.41  )-----------( 225      ( 28 Oct 2019 07:42 )             29.4         Mean Cell Volume : 101.7 fl  Mean Cell Hemoglobin : 30.8 pg  Mean Cell Hemoglobin Concentration : 30.3 gm/dL  Auto Neutrophil # : 1.70 K/uL  Auto Lymphocyte # : 3.79 K/uL  Auto Monocyte # : 0.82 K/uL  Auto Eosinophil # : 0.04 K/uL  Auto Basophil # : 0.05 K/uL  Auto Neutrophil % : 26.5 %  Auto Lymphocyte % : 59.1 %  Auto Monocyte % : 12.8 %  Auto Eosinophil % : 0.6 %  Auto Basophil % : 0.8 %    Serial CBC  Hematocrit 29.4  Hemoglobin 8.9  Plat 225  RBC 2.89  WBC 6.41  Serial CBC  Hematocrit 27.4  Hemoglobin 8.4  Plat 228  RBC 2.73  WBC 6.46  Serial CBC  Hematocrit 28.0  Hemoglobin 8.6  Plat 225  RBC 2.80  WBC 6.72  Serial CBC  Hematocrit --  Hemoglobin --  Plat --  RBC 2.83  WBC --  Serial CBC  Hematocrit 28.3  Hemoglobin 8.7  Plat 228  RBC 2.85  WBC 6.78  Serial CBC  Hematocrit 29.0  Hemoglobin 9.0  Plat 247  RBC 2.90  WBC 9.11    10-28    141  |  110<H>  |  23<H>  ----------------------------<  94  4.1   |  25  |  1.03    Ca    9.0      28 Oct 2019 07:42  Phos  3.8     10-28  Mg     2.2     10-28            Reticulocyte Percent: 1.9 % (10-28 @ 07:42)  Ferritin, Serum: 172 ng/mL (10-26 @ 00:52)  Reticulocyte Percent: 1.3 % (10-25 @ 19:06)  Vitamin B12, Serum: 888 pg/mL (10-25 @ 09:42)      YULI Kappa: 8.76 mg/dL (10-26 @ 00:48)  YULI Lambda: 1.33 mg/dL (10-26 @ 00:48)        BLOOD SMEAR INTERPRETATION:       RADIOLOGY & ADDITIONAL STUDIES:

## 2019-10-28 NOTE — PROGRESS NOTE ADULT - REASON FOR ADMISSION
generalised weakness
generalized weakness
generalised weakness
generalised weakness
generalized weakness
generalised weakness
Generalized Weakness
generalised weakness

## 2019-10-28 NOTE — PROGRESS NOTE ADULT - PROBLEM SELECTOR PLAN 1
p/w weakness, pale skin, SOB, h/o CAD   - Hb 8.1 --> 9.0 --> 8.7 s/p 1 unit of pRBC  - pt denies any dark stools, active bleeding  - D/d :combination of  chronic blood loss   - Last EGD 5/19 : gastritis and esophagitis   - Last colonoscopy 5/19 : Internal hemorrhoids   - Pt has been taking PPI on and off   -s/p 1 PRBC in ED  - will start on Protonix 40 IV BID   - f/u CBC post tranfusion   -f/u CXR and UA   - GI consult Dr Argueta   - Pt eval  FOBT negative  Haptoglobin is normal, and LDH is elevated  Dr. Castellanos to to consider MDS  Patient continues to maintain her hemoglobin levels

## 2019-10-28 NOTE — PROGRESS NOTE ADULT - SUBJECTIVE AND OBJECTIVE BOX
Pt is awake, alert, lying in bed in NAD. Feels well. Still having shortness of breath with ambulation. Also reports phlegm in throat. Denies rhinorrhea.     INTERVAL HPI/OVERNIGHT EVENTS:      VITAL SIGNS:  T(F): 97.6 (10-28-19 @ 07:45)  HR: 91 (10-28-19 @ 07:45)  BP: 103/66 (10-28-19 @ 07:45)  RR: 18 (10-28-19 @ 07:45)  SpO2: 95% (10-28-19 @ 07:45)  Wt(kg): --  I&O's Detail          REVIEW OF SYSTEMS:    CONSTITUTIONAL:  No fevers, chills, sweats    HEENT:  Eyes:  No diplopia or blurred vision. ENT:  No earache, sore throat or runny nose.    CARDIOVASCULAR:  No pressure, squeezing, tightness, or heaviness about the chest; no palpitations.    RESPIRATORY:  Per HPI    GASTROINTESTINAL:  No abdominal pain, nausea, vomiting or diarrhea.    GENITOURINARY:  No dysuria, frequency or urgency.    NEUROLOGIC:  No paresthesias, fasciculations, seizures or weakness.    PSYCHIATRIC:  No disorder of thought or mood.      PHYSICAL EXAM:    Constitutional: Well developed and nourished  Eyes:Perrla  ENMT: normal  Neck:supple  Respiratory: good air entry  Cardiovascular: S1 S2 regular  Gastrointestinal: Soft, Non tender  Extremities: No edema  Vascular:normal  Neurological:Awake, alert,Ox3  Musculoskeletal:Normal      MEDICATIONS  (STANDING):  aspirin  chewable 81 milliGRAM(s) Oral daily  atorvastatin 40 milliGRAM(s) Oral at bedtime  cefTRIAXone   IVPB      cefTRIAXone   IVPB 1000 milliGRAM(s) IV Intermittent every 24 hours  enoxaparin Injectable 40 milliGRAM(s) SubCutaneous daily  metoprolol succinate ER 12.5 milliGRAM(s) Oral daily  oxybutynin XL 10 milliGRAM(s) Oral daily  pantoprazole  Injectable 40 milliGRAM(s) IV Push two times a day    MEDICATIONS  (PRN):      Allergies    No Known Allergies    Intolerances        LABS:                        8.9    6.41  )-----------( 225      ( 28 Oct 2019 07:42 )             29.4     10-28    141  |  110<H>  |  23<H>  ----------------------------<  94  4.1   |  25  |  1.03    Ca    9.0      28 Oct 2019 07:42  Phos  3.8     10-28  Mg     2.2     10-28                CAPILLARY BLOOD GLUCOSE            RADIOLOGY & ADDITIONAL TESTS:  < from: CT Abdomen and Pelvis No Cont (10.26.19 @ 13:31) >  IMPRESSION:     Stable from 5/14 appearance of the pancreatic tail as described, no   compelling mass.    Air in the endometrial cavity, new from 5/14. No compelling fistula.   Correlate for recent instrumentation, endometritis. Other findings as   above, stable from 5/14.    < end of copied text >    CXR:    Ct scan chest:    ekg;    echo:

## 2019-10-28 NOTE — PROGRESS NOTE ADULT - SUBJECTIVE AND OBJECTIVE BOX
[   ] ICU                                          [   ] CCU                                      [ X  ] Medical Floor    Patient is comfortable. No new complaints reported, No abdominal pain, N/V, hematemesis, hematochezia, melena, fever, chills, chest pain, SOB, cough or diarrhea reported.    VITALS  Vital Signs Last 24 Hrs  T(C): 36.7 (28 Oct 2019 11:05), Max: 36.8 (27 Oct 2019 15:29)  T(F): 98.1 (28 Oct 2019 11:05), Max: 98.2 (27 Oct 2019 15:29)  HR: 92 (28 Oct 2019 11:05) (82 - 101)  BP: 107/63 (28 Oct 2019 11:05) (98/63 - 124/78)   RR: 18 (28 Oct 2019 11:05) (17 - 18)  SpO2: 96% (28 Oct 2019 11:05) (91% - 97%)       MEDICATIONS  (STANDING):  aspirin  chewable 81 milliGRAM(s) Oral daily  atorvastatin 40 milliGRAM(s) Oral at bedtime  cefTRIAXone   IVPB      cefTRIAXone   IVPB 1000 milliGRAM(s) IV Intermittent every 24 hours  enoxaparin Injectable 40 milliGRAM(s) SubCutaneous daily  metoprolol succinate ER 12.5 milliGRAM(s) Oral daily  montelukast 10 milliGRAM(s) Oral at bedtime  oxybutynin XL 10 milliGRAM(s) Oral daily  pantoprazole  Injectable 40 milliGRAM(s) IV Push two times a day    MEDICATIONS  (PRN):                            8.9    6.41  )-----------( 225      ( 28 Oct 2019 07:42 )             29.4       10-28    141  |  110<H>  |  23<H>  ----------------------------<  94  4.1   |  25  |  1.03    Ca    9.0      28 Oct 2019 07:42  Phos  3.8     10-28  Mg     2.2     10-28

## 2019-10-28 NOTE — PROGRESS NOTE ADULT - PROBLEM SELECTOR PLAN 5
pt on aspirin and statins   - c/w home meds for now

## 2019-10-28 NOTE — PROGRESS NOTE ADULT - PROBLEM SELECTOR PLAN 2
K/L >6  YULI and SPEP are not done  will do it as outpt  discussed with family, will do a BM exam as outpt
likely demand ischemia in the setting of anemia   - no chest pain ,ECG : NSR   - T1 0.438 T2 0.455 ,trend trop till peak   - last Echo 5/19 : 55% ef   - c/w aspirin , lopressor and statins   - cardio consult Dr sullivan

## 2019-10-28 NOTE — PROGRESS NOTE ADULT - PROBLEM SELECTOR PLAN 3
Patient was found to have a +UA  f/u UCx  Ceftriaxone 1 g daily

## 2019-10-28 NOTE — DISCHARGE NOTE NURSING/CASE MANAGEMENT/SOCIAL WORK - PATIENT PORTAL LINK FT
You can access the FollowMyHealth Patient Portal offered by [facility] by registering at the following website: [email or web address]. By joining [facility]’s FollowMyHealth portal, you will also be able to view your health information using other applications (apps) compatible with our system.

## 2019-10-28 NOTE — PROGRESS NOTE ADULT - SUBJECTIVE AND OBJECTIVE BOX
CHIEF COMPLAINT:Patient is a 74y old  Female who presents with a chief complaint of generalised weakness.Pt appears comfortable.    	  REVIEW OF SYSTEMS:  CONSTITUTIONAL: No fever, weight loss, or fatigue  EYES: No eye pain, visual disturbances, or discharge  ENT:  No difficulty hearing, tinnitus, vertigo; No sinus or throat pain  NECK: No pain or stiffness  RESPIRATORY: No cough, wheezing, chills or hemoptysis; No Shortness of Breath  CARDIOVASCULAR: No chest pain, palpitations, passing out, dizziness, or leg swelling  GASTROINTESTINAL: No abdominal or epigastric pain. No nausea, vomiting, or hematemesis; No diarrhea or constipation. No melena or hematochezia.  GENITOURINARY: No dysuria, frequency, hematuria, or incontinence  NEUROLOGICAL: No headaches, memory loss, loss of strength, numbness, or tremors  SKIN: No itching, burning, rashes, or lesions   LYMPH Nodes: No enlarged glands  ENDOCRINE: No heat or cold intolerance; No hair loss  MUSCULOSKELETAL: No joint pain or swelling; No muscle, back, or extremity pain  PSYCHIATRIC: No depression, anxiety, mood swings, or difficulty sleeping  HEME/LYMPH: No easy bruising, or bleeding gums  ALLERGY AND IMMUNOLOGIC: No hives or eczema	      PHYSICAL EXAM:  T(C): 36.4 (10-28-19 @ 07:45), Max: 36.8 (10-27-19 @ 11:48)  HR: 91 (10-28-19 @ 07:45) (82 - 101)  BP: 103/66 (10-28-19 @ 07:45) (98/63 - 124/78)  RR: 18 (10-28-19 @ 07:45) (17 - 18)  SpO2: 95% (10-28-19 @ 07:45) (91% - 98%)      Appearance: Normal	  HEENT:   Normal oral mucosa, PERRL, EOMI	  Lymphatic: No lymphadenopathy  Cardiovascular: Normal S1 S2, No JVD, No murmurs, No edema  Respiratory: Lungs clear to auscultation	  Psychiatry: A & O x 3, Mood & affect appropriate  Gastrointestinal:  Soft, Non-tender, + BS	  Skin: No rashes, No ecchymoses, No cyanosis	  Neurologic: Non-focal  Extremities: Normal range of motion, No clubbing, cyanosis or edema  Vascular: Peripheral pulses palpable 2+ bilaterally    MEDICATIONS  (STANDING):  aspirin  chewable 81 milliGRAM(s) Oral daily  atorvastatin 40 milliGRAM(s) Oral at bedtime  cefTRIAXone   IVPB      cefTRIAXone   IVPB 1000 milliGRAM(s) IV Intermittent every 24 hours  enoxaparin Injectable 40 milliGRAM(s) SubCutaneous daily  metoprolol succinate ER 12.5 milliGRAM(s) Oral daily  oxybutynin XL 10 milliGRAM(s) Oral daily  pantoprazole  Injectable 40 milliGRAM(s) IV Push two times a day      	  LABS:	 	                       8.9    6.41  )-----------( 225      ( 28 Oct 2019 07:42 )             29.4     10-28    141  |  110<H>  |  23<H>  ----------------------------<  94  4.1   |  25  |  1.03    Ca    9.0      28 Oct 2019 07:42  Phos  3.8     10-28  Mg     2.2     10-28        Lipid Profile: Cholesterol 180  LDL 92  HDL 37      HgA1c: Hemoglobin A1C, Whole Blood: 5.9 % (10-25 @ 10:11)    TSH: Thyroid Stimulating Hormone, Serum: 0.68 uU/mL (10-26 @ 07:23)  Thyroid Stimulating Hormone, Serum: 0.47 uU/mL (10-25 @ 06:34)

## 2019-10-28 NOTE — PROGRESS NOTE ADULT - ASSESSMENT
72 y/o F Greek speaking from home, walks with cane, no HHA ,lives with daughter with PMHx of HTN, HLD, CAD (1 stent in 2014), and arthritis presents to the ED with c/o worsening  generalized weakness x 1 day, anemia, abnormal EKG.  1.D/C Tele monitoring.  2.Heme f/u-?MDS.  3.CAD-asa,statin,b blocker.  4.Anemia-s/p 1 PRBC.  5.PPI.
74 y/o F American speaking from home, walks with cane, no HHA ,lives with daughter with PMHx of HTN, HLD, CAD (1 stent in 2014), and arthritis presents to the ED with c/o worsening  generalized weakness x 1 day.    Pt will be admitted to tele for symptomatic anemia and elevated troponins
1. Weakness  - Likely 2nd to Anemia  - Monitor H/H  - Transfuse PRBC PRN  - Heme/onc follow up   - Anemia panel    2. Abnormal EKG  - ACS Protocol   - Tele monitoring  - Cardio eval  - DVT and GI PPX     3. Asthma  - Not in acute exacerbation  - Bronchodilators PRN  - O2 Supp PRN  - PFTs as OP    4. CHIQUITA  - Noncompliant with CPAP use  - Will further discuss use of oral appliance as OP  - PSG's as OP
1. Weakness  - Likely 2nd to Anemia  - Monitor H/H  - Transfuse PRBC PRN  - Heme/onc follow up   -Anemia panel    2. Abnormal EKG  - ACS Protocol   - Tele monitoring  - Cardio eval  - DVT and GI PPX     3. Asthma  - Not in acute exacerbation  - Bronchodilators PRN  - O2 Supp PRN  - PFTs as OP    4. CHIQUITA  - Noncompliant with CPAP use  - Will further discuss use of oral appliance as OP  -  PSG's as OP
1. Weakness  - Likely 2nd to Anemia  - Monitor H/H  - Transfuse PRBC PRN  - Heme/onc follow up -  R/O MDS  - GI F/U - may need capsule egd.     - Anemia panel    2. Abnormal EKG  - ACS Protocol   - Tele monitoring  - Cardio eval  - DVT and GI PPX     3. Asthma  - Not in acute exacerbation  - Bronchodilators PRN  - O2 Supp PRN  - PFTs as OP  - Add Montelukast for post-nasal drip and GONZALEZ symptoms.     4. CHIQUITA  - Noncompliant with CPAP use  - Will further discuss use of oral appliance as OP  - PSG's as OP
72 y/o F Bruneian speaking from home, walks with cane, no HHA ,lives with daughter with PMHx of HTN, HLD, CAD (1 stent in 2014), and arthritis presents to the ED with c/o worsening  generalized weakness x 1 day, anemia, abnormal EKG.  1.Tele monitoring.  2.Heme eval noted ?MDS.  3.CAD-asa,statin,b blocker.  4.Anemia-s/p 1 PRBC.  5.PPI.
72 y/o F Georgian speaking from home, walks with cane, no HHA ,lives with daughter with PMHx of HTN, HLD, CAD (1 stent in 2014), and arthritis presents to the ED with c/o worsening  generalized weakness x 1 day, anemia, abnormal EKG.  1.PPI  2.Heme f/u-?MDS.  3.CAD-asa,statin,dec b blocker 12.5mg qd.  4.Anemia-s/p 1 PRBC.
72 y/o F Tristanian speaking from home, walks with cane, no HHA ,lives with daughter with PMHx of HTN, HLD, CAD (1 stent in 2014), and arthritis presents to the ED with c/o worsening  generalized weakness x 1 day.    Pt will be admitted to tele for symptomatic anemia and elevated troponins     Patient to be discharged on 12.5 Metoprolol XL daily.
74 y/o F Grenadian speaking from home, walks with cane, no HHA ,lives with daughter with PMHx of HTN, HLD, CAD (1 stent in 2014), and arthritis presents to the ED with c/o worsening  generalized weakness x 1 day.    Pt will be admitted to tele for symptomatic anemia and elevated troponins
1. The etiology for iron deficiency anemia in this patient is not clear  2. Pancreatic tail mass  R/o pancreatic Ca  3. Diverticulosis but no diverticulitis    Suggestions:    1. Monitor H/H  2. Transfuse PRBC as needed  3. Protonix daily  4. Consider capsule endoscopy  5. Avoid NSAID  6. Check CEA and   7. DVT prophylaxis

## 2020-01-01 ENCOUNTER — INPATIENT (INPATIENT)
Facility: HOSPITAL | Age: 75
LOS: 1 days | DRG: 871 | End: 2020-03-29
Attending: INTERNAL MEDICINE | Admitting: INTERNAL MEDICINE
Payer: MEDICARE

## 2020-01-01 VITALS — DIASTOLIC BLOOD PRESSURE: 40 MMHG | HEART RATE: 134 BPM | SYSTOLIC BLOOD PRESSURE: 82 MMHG

## 2020-01-01 VITALS
WEIGHT: 250 LBS | SYSTOLIC BLOOD PRESSURE: 158 MMHG | HEIGHT: 62.2 IN | TEMPERATURE: 98 F | HEART RATE: 88 BPM | OXYGEN SATURATION: 98 % | DIASTOLIC BLOOD PRESSURE: 94 MMHG | RESPIRATION RATE: 24 BRPM

## 2020-01-01 DIAGNOSIS — I25.10 ATHEROSCLEROTIC HEART DISEASE OF NATIVE CORONARY ARTERY WITHOUT ANGINA PECTORIS: ICD-10-CM

## 2020-01-01 DIAGNOSIS — D64.9 ANEMIA, UNSPECIFIED: ICD-10-CM

## 2020-01-01 DIAGNOSIS — E78.5 HYPERLIPIDEMIA, UNSPECIFIED: ICD-10-CM

## 2020-01-01 DIAGNOSIS — A41.9 SEPSIS, UNSPECIFIED ORGANISM: ICD-10-CM

## 2020-01-01 DIAGNOSIS — J18.9 PNEUMONIA, UNSPECIFIED ORGANISM: ICD-10-CM

## 2020-01-01 DIAGNOSIS — D69.6 THROMBOCYTOPENIA, UNSPECIFIED: ICD-10-CM

## 2020-01-01 DIAGNOSIS — N17.9 ACUTE KIDNEY FAILURE, UNSPECIFIED: ICD-10-CM

## 2020-01-01 DIAGNOSIS — J96.01 ACUTE RESPIRATORY FAILURE WITH HYPOXIA: ICD-10-CM

## 2020-01-01 DIAGNOSIS — I95.9 HYPOTENSION, UNSPECIFIED: ICD-10-CM

## 2020-01-01 LAB
-  COAGULASE NEGATIVE STAPHYLOCOCCUS: SIGNIFICANT CHANGE UP
ALBUMIN SERPL ELPH-MCNC: 2.9 G/DL — LOW (ref 3.5–5)
ALBUMIN SERPL ELPH-MCNC: 3 G/DL — LOW (ref 3.5–5)
ALP SERPL-CCNC: 80 U/L — SIGNIFICANT CHANGE UP (ref 40–120)
ALP SERPL-CCNC: 80 U/L — SIGNIFICANT CHANGE UP (ref 40–120)
ALT FLD-CCNC: 21 U/L DA — SIGNIFICANT CHANGE UP (ref 10–60)
ALT FLD-CCNC: 41 U/L DA — SIGNIFICANT CHANGE UP (ref 10–60)
ANION GAP SERPL CALC-SCNC: 10 MMOL/L — SIGNIFICANT CHANGE UP (ref 5–17)
ANION GAP SERPL CALC-SCNC: 10 MMOL/L — SIGNIFICANT CHANGE UP (ref 5–17)
APPEARANCE UR: CLEAR — SIGNIFICANT CHANGE UP
APTT BLD: 33.4 SEC — SIGNIFICANT CHANGE UP (ref 27.5–36.3)
AST SERPL-CCNC: 31 U/L — SIGNIFICANT CHANGE UP (ref 10–40)
AST SERPL-CCNC: 64 U/L — HIGH (ref 10–40)
BASOPHILS # BLD AUTO: 0.01 K/UL — SIGNIFICANT CHANGE UP (ref 0–0.2)
BASOPHILS NFR BLD AUTO: 0.1 % — SIGNIFICANT CHANGE UP (ref 0–2)
BILIRUB SERPL-MCNC: 0.3 MG/DL — SIGNIFICANT CHANGE UP (ref 0.2–1.2)
BILIRUB SERPL-MCNC: 0.3 MG/DL — SIGNIFICANT CHANGE UP (ref 0.2–1.2)
BILIRUB UR-MCNC: NEGATIVE — SIGNIFICANT CHANGE UP
BUN SERPL-MCNC: 45 MG/DL — HIGH (ref 7–18)
BUN SERPL-MCNC: 51 MG/DL — HIGH (ref 7–18)
CALCIUM SERPL-MCNC: 9 MG/DL — SIGNIFICANT CHANGE UP (ref 8.4–10.5)
CALCIUM SERPL-MCNC: 9.5 MG/DL — SIGNIFICANT CHANGE UP (ref 8.4–10.5)
CHLORIDE SERPL-SCNC: 107 MMOL/L — SIGNIFICANT CHANGE UP (ref 96–108)
CHLORIDE SERPL-SCNC: 111 MMOL/L — HIGH (ref 96–108)
CO2 SERPL-SCNC: 18 MMOL/L — LOW (ref 22–31)
CO2 SERPL-SCNC: 22 MMOL/L — SIGNIFICANT CHANGE UP (ref 22–31)
COLOR SPEC: YELLOW — SIGNIFICANT CHANGE UP
CREAT SERPL-MCNC: 2.07 MG/DL — HIGH (ref 0.5–1.3)
CREAT SERPL-MCNC: 2.19 MG/DL — HIGH (ref 0.5–1.3)
CRP SERPL-MCNC: 34.05 MG/DL — HIGH (ref 0–0.4)
D DIMER BLD IA.RAPID-MCNC: 326 NG/ML DDU — HIGH
DIFF PNL FLD: ABNORMAL
EOSINOPHIL # BLD AUTO: 0 K/UL — SIGNIFICANT CHANGE UP (ref 0–0.5)
EOSINOPHIL NFR BLD AUTO: 0 % — SIGNIFICANT CHANGE UP (ref 0–6)
FERRITIN SERPL-MCNC: 1291 NG/ML — HIGH (ref 15–150)
FIBRINOGEN PPP-MCNC: 1216 MG/DL — HIGH (ref 350–510)
FLU A RESULT: SIGNIFICANT CHANGE UP
FLU A RESULT: SIGNIFICANT CHANGE UP
FLUAV AG NPH QL: SIGNIFICANT CHANGE UP
FLUBV AG NPH QL: SIGNIFICANT CHANGE UP
FSP PPP-MCNC: < 5 — SIGNIFICANT CHANGE UP
GLUCOSE BLDC GLUCOMTR-MCNC: 174 MG/DL — HIGH (ref 70–99)
GLUCOSE SERPL-MCNC: 125 MG/DL — HIGH (ref 70–99)
GLUCOSE SERPL-MCNC: 143 MG/DL — HIGH (ref 70–99)
GLUCOSE UR QL: NEGATIVE — SIGNIFICANT CHANGE UP
GRAM STN FLD: SIGNIFICANT CHANGE UP
HBA1C BLD-MCNC: 6 % — HIGH (ref 4–5.6)
HCT VFR BLD CALC: 29 % — LOW (ref 34.5–45)
HCT VFR BLD CALC: 29.6 % — LOW (ref 34.5–45)
HGB BLD-MCNC: 8.9 G/DL — LOW (ref 11.5–15.5)
HGB BLD-MCNC: 9 G/DL — LOW (ref 11.5–15.5)
IMM GRANULOCYTES NFR BLD AUTO: 0.6 % — SIGNIFICANT CHANGE UP (ref 0–1.5)
INR BLD: 1.09 RATIO — SIGNIFICANT CHANGE UP (ref 0.88–1.16)
KETONES UR-MCNC: ABNORMAL
LACTATE SERPL-SCNC: 1.9 MMOL/L — SIGNIFICANT CHANGE UP (ref 0.7–2)
LDH SERPL L TO P-CCNC: 401 U/L — HIGH (ref 120–225)
LEUKOCYTE ESTERASE UR-ACNC: ABNORMAL
LYMPHOCYTES # BLD AUTO: 21.8 % — SIGNIFICANT CHANGE UP (ref 13–44)
LYMPHOCYTES # BLD AUTO: 3.27 K/UL — SIGNIFICANT CHANGE UP (ref 1–3.3)
MAGNESIUM SERPL-MCNC: 2.4 MG/DL — SIGNIFICANT CHANGE UP (ref 1.6–2.6)
MCHC RBC-ENTMCNC: 30.1 GM/DL — LOW (ref 32–36)
MCHC RBC-ENTMCNC: 30.7 PG — SIGNIFICANT CHANGE UP (ref 27–34)
MCHC RBC-ENTMCNC: 30.8 PG — SIGNIFICANT CHANGE UP (ref 27–34)
MCHC RBC-ENTMCNC: 31 GM/DL — LOW (ref 32–36)
MCV RBC AUTO: 102.1 FL — HIGH (ref 80–100)
MCV RBC AUTO: 99.3 FL — SIGNIFICANT CHANGE UP (ref 80–100)
METHOD TYPE: SIGNIFICANT CHANGE UP
MONOCYTES # BLD AUTO: 0.73 K/UL — SIGNIFICANT CHANGE UP (ref 0–0.9)
MONOCYTES NFR BLD AUTO: 4.9 % — SIGNIFICANT CHANGE UP (ref 2–14)
NEUTROPHILS # BLD AUTO: 10.9 K/UL — HIGH (ref 1.8–7.4)
NEUTROPHILS NFR BLD AUTO: 72.6 % — SIGNIFICANT CHANGE UP (ref 43–77)
NITRITE UR-MCNC: NEGATIVE — SIGNIFICANT CHANGE UP
NRBC # BLD: 0 /100 WBCS — SIGNIFICANT CHANGE UP (ref 0–0)
NRBC # BLD: 0 /100 WBCS — SIGNIFICANT CHANGE UP (ref 0–0)
NT-PROBNP SERPL-SCNC: HIGH PG/ML (ref 0–450)
OSMOLALITY SERPL: 312 MOSMOL/KG — HIGH (ref 280–301)
PH UR: 5 — SIGNIFICANT CHANGE UP (ref 5–8)
PHOSPHATE SERPL-MCNC: 6.9 MG/DL — HIGH (ref 2.5–4.5)
PLATELET # BLD AUTO: 121 K/UL — LOW (ref 150–400)
PLATELET # BLD AUTO: 71 K/UL — LOW (ref 150–400)
POTASSIUM SERPL-MCNC: 4.4 MMOL/L — SIGNIFICANT CHANGE UP (ref 3.5–5.3)
POTASSIUM SERPL-MCNC: 4.8 MMOL/L — SIGNIFICANT CHANGE UP (ref 3.5–5.3)
POTASSIUM SERPL-SCNC: 4.4 MMOL/L — SIGNIFICANT CHANGE UP (ref 3.5–5.3)
POTASSIUM SERPL-SCNC: 4.8 MMOL/L — SIGNIFICANT CHANGE UP (ref 3.5–5.3)
PROT SERPL-MCNC: 7.9 G/DL — SIGNIFICANT CHANGE UP (ref 6–8.3)
PROT SERPL-MCNC: 8.4 G/DL — HIGH (ref 6–8.3)
PROT UR-MCNC: 30 MG/DL
PROTHROM AB SERPL-ACNC: 12.3 SEC — SIGNIFICANT CHANGE UP (ref 10–12.9)
RBC # BLD: 2.9 M/UL — LOW (ref 3.8–5.2)
RBC # BLD: 2.92 M/UL — LOW (ref 3.8–5.2)
RBC # FLD: 16.9 % — HIGH (ref 10.3–14.5)
RBC # FLD: 17.1 % — HIGH (ref 10.3–14.5)
RSV RESULT: SIGNIFICANT CHANGE UP
RSV RNA RESP QL NAA+PROBE: SIGNIFICANT CHANGE UP
SARS-COV-2 RNA SPEC QL NAA+PROBE: DETECTED
SODIUM SERPL-SCNC: 139 MMOL/L — SIGNIFICANT CHANGE UP (ref 135–145)
SODIUM SERPL-SCNC: 139 MMOL/L — SIGNIFICANT CHANGE UP (ref 135–145)
SP GR SPEC: 1.02 — SIGNIFICANT CHANGE UP (ref 1.01–1.02)
SPECIMEN SOURCE: SIGNIFICANT CHANGE UP
TRIGL SERPL-MCNC: 147 MG/DL — SIGNIFICANT CHANGE UP (ref 10–149)
TROPONIN I SERPL-MCNC: 0.43 NG/ML — HIGH (ref 0–0.04)
UROBILINOGEN FLD QL: 1
WBC # BLD: 15 K/UL — HIGH (ref 3.8–10.5)
WBC # BLD: 17.58 K/UL — HIGH (ref 3.8–10.5)
WBC # FLD AUTO: 15 K/UL — HIGH (ref 3.8–10.5)
WBC # FLD AUTO: 17.58 K/UL — HIGH (ref 3.8–10.5)

## 2020-01-01 PROCEDURE — 99285 EMERGENCY DEPT VISIT HI MDM: CPT

## 2020-01-01 PROCEDURE — 71045 X-RAY EXAM CHEST 1 VIEW: CPT | Mod: 26

## 2020-01-01 PROCEDURE — 99223 1ST HOSP IP/OBS HIGH 75: CPT

## 2020-01-01 RX ORDER — ACETAMINOPHEN 500 MG
650 TABLET ORAL ONCE
Refills: 0 | Status: COMPLETED | OUTPATIENT
Start: 2020-01-01 | End: 2020-01-01

## 2020-01-01 RX ORDER — HYDROXYCHLOROQUINE SULFATE 200 MG
TABLET ORAL
Refills: 0 | Status: DISCONTINUED | OUTPATIENT
Start: 2020-01-01 | End: 2020-01-01

## 2020-01-01 RX ORDER — HYDROXYCHLOROQUINE SULFATE 200 MG
200 TABLET ORAL EVERY 12 HOURS
Refills: 0 | Status: DISCONTINUED | OUTPATIENT
Start: 2020-01-01 | End: 2020-01-01

## 2020-01-01 RX ORDER — OXYBUTYNIN CHLORIDE 5 MG
5 TABLET ORAL
Refills: 0 | Status: DISCONTINUED | OUTPATIENT
Start: 2020-01-01 | End: 2020-01-01

## 2020-01-01 RX ORDER — PANTOPRAZOLE SODIUM 20 MG/1
40 TABLET, DELAYED RELEASE ORAL
Refills: 0 | Status: DISCONTINUED | OUTPATIENT
Start: 2020-01-01 | End: 2020-01-01

## 2020-01-01 RX ORDER — ASPIRIN/CALCIUM CARB/MAGNESIUM 324 MG
81 TABLET ORAL DAILY
Refills: 0 | Status: DISCONTINUED | OUTPATIENT
Start: 2020-01-01 | End: 2020-01-01

## 2020-01-01 RX ORDER — SODIUM CHLORIDE 9 MG/ML
1550 INJECTION INTRAMUSCULAR; INTRAVENOUS; SUBCUTANEOUS ONCE
Refills: 0 | Status: COMPLETED | OUTPATIENT
Start: 2020-01-01 | End: 2020-01-01

## 2020-01-01 RX ORDER — HYDROXYCHLOROQUINE SULFATE 200 MG
400 TABLET ORAL EVERY 12 HOURS
Refills: 0 | Status: COMPLETED | OUTPATIENT
Start: 2020-01-01 | End: 2020-01-01

## 2020-01-01 RX ORDER — MONTELUKAST 4 MG/1
10 TABLET, CHEWABLE ORAL AT BEDTIME
Refills: 0 | Status: DISCONTINUED | OUTPATIENT
Start: 2020-01-01 | End: 2020-01-01

## 2020-01-01 RX ORDER — METOPROLOL TARTRATE 50 MG
25 TABLET ORAL
Refills: 0 | Status: DISCONTINUED | OUTPATIENT
Start: 2020-01-01 | End: 2020-01-01

## 2020-01-01 RX ORDER — CHOLECALCIFEROL (VITAMIN D3) 125 MCG
1000 CAPSULE ORAL DAILY
Refills: 0 | Status: DISCONTINUED | OUTPATIENT
Start: 2020-01-01 | End: 2020-01-01

## 2020-01-01 RX ORDER — CEFTRIAXONE 500 MG/1
1000 INJECTION, POWDER, FOR SOLUTION INTRAMUSCULAR; INTRAVENOUS EVERY 24 HOURS
Refills: 0 | Status: DISCONTINUED | OUTPATIENT
Start: 2020-01-01 | End: 2020-01-01

## 2020-01-01 RX ORDER — ACETAMINOPHEN 500 MG
650 TABLET ORAL EVERY 6 HOURS
Refills: 0 | Status: DISCONTINUED | OUTPATIENT
Start: 2020-01-01 | End: 2020-01-01

## 2020-01-01 RX ORDER — AZITHROMYCIN 500 MG/1
500 TABLET, FILM COATED ORAL DAILY
Refills: 0 | Status: DISCONTINUED | OUTPATIENT
Start: 2020-01-01 | End: 2020-01-01

## 2020-01-01 RX ORDER — PREGABALIN 225 MG/1
1000 CAPSULE ORAL DAILY
Refills: 0 | Status: DISCONTINUED | OUTPATIENT
Start: 2020-01-01 | End: 2020-01-01

## 2020-01-01 RX ORDER — ATORVASTATIN CALCIUM 80 MG/1
40 TABLET, FILM COATED ORAL AT BEDTIME
Refills: 0 | Status: DISCONTINUED | OUTPATIENT
Start: 2020-01-01 | End: 2020-01-01

## 2020-01-01 RX ADMIN — AZITHROMYCIN 500 MILLIGRAM(S): 500 TABLET, FILM COATED ORAL at 18:13

## 2020-01-01 RX ADMIN — MONTELUKAST 10 MILLIGRAM(S): 4 TABLET, CHEWABLE ORAL at 01:05

## 2020-01-01 RX ADMIN — Medication 400 MILLIGRAM(S): at 11:25

## 2020-01-01 RX ADMIN — PANTOPRAZOLE SODIUM 40 MILLIGRAM(S): 20 TABLET, DELAYED RELEASE ORAL at 09:15

## 2020-01-01 RX ADMIN — Medication 650 MILLIGRAM(S): at 01:59

## 2020-01-01 RX ADMIN — Medication 100 MILLIGRAM(S): at 02:03

## 2020-01-01 RX ADMIN — ATORVASTATIN CALCIUM 40 MILLIGRAM(S): 80 TABLET, FILM COATED ORAL at 01:05

## 2020-01-01 RX ADMIN — Medication 5 MILLIGRAM(S): at 18:13

## 2020-01-01 RX ADMIN — CEFTRIAXONE 100 MILLIGRAM(S): 500 INJECTION, POWDER, FOR SOLUTION INTRAMUSCULAR; INTRAVENOUS at 18:13

## 2020-01-01 RX ADMIN — Medication 25 MILLIGRAM(S): at 18:15

## 2020-01-01 RX ADMIN — Medication 400 MILLIGRAM(S): at 18:14

## 2020-01-01 RX ADMIN — Medication 650 MILLIGRAM(S): at 06:53

## 2020-01-01 RX ADMIN — Medication 25 MILLIGRAM(S): at 06:56

## 2020-01-01 RX ADMIN — Medication 5 MILLIGRAM(S): at 11:25

## 2020-01-01 RX ADMIN — Medication 1000 UNIT(S): at 13:44

## 2020-01-01 RX ADMIN — PREGABALIN 1000 MICROGRAM(S): 225 CAPSULE ORAL at 13:45

## 2020-01-01 RX ADMIN — SODIUM CHLORIDE 1550 MILLILITER(S): 9 INJECTION INTRAMUSCULAR; INTRAVENOUS; SUBCUTANEOUS at 02:00

## 2020-03-27 NOTE — ED PROVIDER NOTE - PROGRESS NOTE DETAILS
Will sign out to Dr. Wong to f/u labs and admit. (Judith) - labs WBC 15, Hgb 9 (reports similar in past), Cr 2  Sat 88% on NC, so placed on NRB with sat 95%. Endorsed and admitted to Dr Tenorio and MAR.

## 2020-03-27 NOTE — ED PROVIDER NOTE - OBJECTIVE STATEMENT
75 yo female with PMHx of HTN, and CAD, presents with a 4 days history of URI symptoms including fever, cough, shortness of breath, and generalized myalgias. Patient states that she was taking several medications to alleviate her symptoms including Theraflu and Zpak, and another medication that she cannot recall. Patient states she took the medications without relief. Patient states her symptoms have been worsening; she reports worsening shortness of breath. Patient denies chest pain, lightheadedness, nausea, vomiting, and diaphoresis.

## 2020-03-28 NOTE — H&P ADULT - NSHPLABSRESULTS_GEN_ALL_CORE
Labs Reviewed:                         9.0    15.00 )-----------( 71       ( 28 Mar 2020 03:04 )             29.0     03    139  |  107  |  45<H>  ----------------------------<  143<H>  4.4   |  22  |  2.07<H>    Ca    9.5      28 Mar 2020 03:04    TPro  7.9  /  Alb  2.9<L>  /  TBili  0.3  /  DBili  x   /  AST  31  /  ALT  21  /  AlkPhos  80      Urinalysis Basic - ( 28 Mar 2020 03:11   Color: Yellow / Appearance: Clear / S.025 / pH: x  Gluc: x / Ketone: Trace  / Bili: Negative / Urobili: 1   Blood: x / Protein: 30 mg/dL / Nitrite: Negative   Leuk Esterase: Trace / RBC: 0-2 /HPF / WBC 0-2 /HPF   Sq Epi: x / Non Sq Epi: x / Bacteria: x      PT/INR - ( 28 Mar 2020 03:04 )   PT: 12.3 sec;   INR: 1.09 ratio         PTT - ( 28 Mar 2020 03:04 )  PTT:33.4 sec  BNP:   MEDICATIONS  (STANDING):  atorvastatin 40 milliGRAM(s) Oral at bedtime  cholecalciferol 1000 Unit(s) Oral daily  cyanocobalamin 1000 MICROGram(s) Oral daily  hydroxychloroquine   Oral   metoprolol tartrate 25 milliGRAM(s) Oral two times a day  montelukast 10 milliGRAM(s) Oral at bedtime  oxybutynin 5 milliGRAM(s) Oral two times a day  pantoprazole    Tablet 40 milliGRAM(s) Oral before breakfast    MEDICATIONS  (PRN):  acetaminophen   Tablet .. 650 milliGRAM(s) Oral every 6 hours PRN Moderate Pain (4 - 6)      CXR reviewed  EKG Reviewed  Prev echo reviewed- grossly normal EF and valvular fn   Prev hospitalizations reviewed

## 2020-03-28 NOTE — H&P ADULT - NSHPPHYSICALEXAM_GEN_ALL_CORE
Vital Signs Last 24 Hrs  T(C): 36.8 (28 Mar 2020 03:09), Max: 36.8 (28 Mar 2020 03:09)  T(F): 98.2 (28 Mar 2020 03:09), Max: 98.2 (28 Mar 2020 03:09)  HR: 110 (28 Mar 2020 03:09) (88 - 110)  BP: 103/66 (28 Mar 2020 03:09) (103/66 - 158/94)  BP(mean): --  RR: 24 (28 Mar 2020 03:09) (24 - 24)  SpO2: 95% (28 Mar 2020 03:31) (88% - 98%)    GEN- mild resp distress, speaking in full sentences with some acc mm use, on NRB  HEENT- normocephalic; mouth moist  CVS- S1S2+  LUNGS- clear to auscultation; no wheezing  ABD: Soft , nontender, nondistended, Bowel sounds are present  EXTREMITY: no calf tenderness, no cyanosis, no edema  NEURO: AAOx3; non focal neurologic exam; cranial nerves grossly intact  PSYCH: normal affect and behavior  BACK: no swelling or mass;   VASCULAR: ++ distal peripheral pulses  SKIN: warm and dry.

## 2020-03-28 NOTE — CHART NOTE - NSCHARTNOTEFT_GEN_A_CORE
Notified by RN of pt elevated Trop Troponin I, Serum: 0.435:     Pt seen . Pt awake, sitting up on side of bed. NAD, no chest pain per H&P.   vital signs: 117/61-85-18-98% on RA-T98F.    Assessment/Plan   1. Elevated Trop likely due to demand ischemia due to Pneumonia due to COVID 19.       F/U Trop every 6 hrs at 6pm and midnight        -Resume ASA 81mg po daily       -f/u Lipid panel in am and hepatic panel in am.         -Resume Atorvastatin if lipid panel elevated and hepatic panel improved.           -D/w XIMENA Flower, NP Medicine [facility]

## 2020-03-28 NOTE — H&P ADULT - HISTORY OF PRESENT ILLNESS
73 y/o F with CAD, HTN, chronic anemia, CKDIII, esophagitis presenting with 3 day hx of shortness of breath and fever. Patient reports palpitations with minimal exertion but denies chest pain, LE edema, orthopnea. Also denies N/V/D, abdominal pain, urinary sx, NSAID use, overt bleeding events. Denies sick contacts.

## 2020-03-28 NOTE — H&P ADULT - NSHPREVIEWOFSYSTEMS_GEN_ALL_CORE
REVIEW OF SYSTEMS:    CONSTITUTIONAL: +weakness, fevers or chills  EYES/ENT: No visual changes;  No vertigo or throat pain   NECK: No pain or stiffness  RESPIRATORY: +cough, wheezing, hemoptysis; + No shortness of breath  CARDIOVASCULAR: No chest pain or palpitations  GASTROINTESTINAL: No abdominal or epigastric pain. No nausea, vomiting, or hematemesis; No diarrhea or constipation. No melena or hematochezia.  GENITOURINARY: No dysuria, frequency or hematuria  NEUROLOGICAL: No numbness or weakness  SKIN: No itching, rashes

## 2020-03-28 NOTE — H&P ADULT - PROBLEM SELECTOR PLAN 1
Empiric abx, f/u COVID19, ferritin, LDH, IgG, T cell subset, blood cultures, A1C, CRP, RVP, low threshold for ICU

## 2020-03-28 NOTE — H&P ADULT - PROBLEM SELECTOR PLAN 5
Possible underlying BM issue vs acute drop from sepsis and acute infection. INR ok, no bleeding and d dimer, fsp and fibrinogen pending to eval concomitant DIC. Holding pharm DVT ppx.

## 2020-03-28 NOTE — H&P ADULT - PROBLEM SELECTOR PLAN 7
Suspected related to chronic disease vs BM production. Will defer iron studies given recent workup in 10/2019. Denies bleeding, no need for transfusion currently.

## 2020-03-28 NOTE — H&P ADULT - ATTENDING COMMENTS
GOC- full code for now, will discuss with daughter  DVT ppx- defer pharm ppx given acute drop in platelets

## 2020-03-28 NOTE — ED ADULT NURSE NOTE - ED STAT RN HANDOFF DETAILS 2
received pt.in bed at 1910 pt.is  awake and responsive.admitted to medicine for CAP. on CM with NSR  droplet prec. maintained r/o COVID. tropnin 0.435 NP radica aware.transfer to  413 report given to atul mims.

## 2020-03-29 NOTE — ED PROCEDURE NOTE - PROCEDURE ADDITIONAL DETAILS
admitted patient in Emergency Department. hypoxic, began to mark down then asystolic. I intubated patient. CPR initiated

## 2020-03-29 NOTE — ACUTE INTERFACILITY TRANSFER NOTE - PLAN OF CARE
Maintain oxygen saturation greater than 94% on RA Cont NRB for now  Cont antibiotic treatment  Monitor O2 saturation /respiratory status  Monitor fever curve  F/u with COVID 19 result.

## 2020-03-29 NOTE — ACUTE INTERFACILITY TRANSFER NOTE - HOSPITAL COURSE
75 y/o F with CAD s/p PCI 2014, HTN, HLD admitted with hypoxic respiratory failure.  Patient has anemia, thrombocytopenia and JENI. 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 Pt was admitted to medicine for pneumonia & was started on antibiotics. Pt was also  found to have elevated Troponin I, of Serum: 0.435 likely due to demand ischemia due to Pneumonia due to COVID 19. Troponin was being trended.

## 2020-03-29 NOTE — DISCHARGE NOTE FOR THE EXPIRED PATIENT - HOSPITAL COURSE
HPI:  75 y/o F with CAD, HTN, chronic anemia, CKDIII, esophagitis presenting with 3 day hx of shortness of breath and fever. Patient reports palpitations with minimal exertion but denies chest pain, LE edema, orthopnea. Also denies N/V/D, abdominal pain, urinary sx, NSAID use, overt bleeding events. Denies sick contacts. (28 Mar 2020 05:05)    Patient was admitted to medical floor with covid19  pneumonia - initially RRT was called for unresponsiveness. On arrival pulse was thready and patient was breathing agonally. Patient was intubated. Pulse was lost and high quality CRP was started, During 1st code 3 epi was given after which ROSC was achieved. Patient coded again after few mins CRP was restarted and ROSC was achieved after 2 epi and 1 amp of bicarb push.  Patient coded 3rd time CRP was restarted and 2 more epi pushes were given. Asystole was noted during all pulse checks. Patient was  pronounced dead by cardiopulmonary criteria at 6:56 AM.  Family informed.

## 2020-03-30 LAB
-  AMIKACIN: SIGNIFICANT CHANGE UP
-  AMPICILLIN/SULBACTAM: SIGNIFICANT CHANGE UP
-  AMPICILLIN: SIGNIFICANT CHANGE UP
-  AZTREONAM: SIGNIFICANT CHANGE UP
-  CEFAZOLIN: SIGNIFICANT CHANGE UP
-  CEFEPIME: SIGNIFICANT CHANGE UP
-  CEFOTAXIME: SIGNIFICANT CHANGE UP
-  CEFOXITIN: SIGNIFICANT CHANGE UP
-  CEFTAZIDIME: SIGNIFICANT CHANGE UP
-  CEFTRIAXONE: SIGNIFICANT CHANGE UP
-  CEFUROXIME: SIGNIFICANT CHANGE UP
-  CIPROFLOXACIN: SIGNIFICANT CHANGE UP
-  GENTAMICIN: SIGNIFICANT CHANGE UP
-  IMIPENEM: SIGNIFICANT CHANGE UP
-  LEVOFLOXACIN: SIGNIFICANT CHANGE UP
-  MEROPENEM: SIGNIFICANT CHANGE UP
-  NITROFURANTOIN: SIGNIFICANT CHANGE UP
-  PIPERACILLIN/TAZOBACTAM: SIGNIFICANT CHANGE UP
-  TIGECYCLINE: SIGNIFICANT CHANGE UP
-  TOBRAMYCIN: SIGNIFICANT CHANGE UP
-  TRIMETHOPRIM/SULFAMETHOXAZOLE: SIGNIFICANT CHANGE UP
4/8 RATIO: 0.39 RATIO — LOW (ref 0.86–4.14)
ABS CD8: 1182 /UL — HIGH (ref 90–775)
CD16+CD56+ CELLS NFR BLD: 9 % — SIGNIFICANT CHANGE UP (ref 7–27)
CD16+CD56+ CELLS NFR SPEC: 174 /UL — SIGNIFICANT CHANGE UP (ref 80–426)
CD19 BLASTS SPEC-ACNC: 1 % — LOW (ref 4–18)
CD19 BLASTS SPEC-ACNC: 20 /UL — LOW (ref 32–326)
CD3 BLASTS SPEC-ACNC: 1671 /UL — SIGNIFICANT CHANGE UP (ref 396–2024)
CD3 BLASTS SPEC-ACNC: 88 % — HIGH (ref 58–84)
CD4 %: 24 % — LOW (ref 30–56)
CD8 %: 62 % — HIGH (ref 11–43)
CULTURE RESULTS: SIGNIFICANT CHANGE UP
CULTURE RESULTS: SIGNIFICANT CHANGE UP
IGA FLD-MCNC: 93 MG/DL — SIGNIFICANT CHANGE UP (ref 84–499)
IGG FLD-MCNC: 1631 MG/DL — SIGNIFICANT CHANGE UP (ref 610–1660)
IGM SERPL-MCNC: 35 MG/DL — SIGNIFICANT CHANGE UP (ref 35–242)
KAPPA LC SER QL IFE: 5.41 MG/DL — HIGH (ref 0.33–1.94)
KAPPA/LAMBDA FREE LIGHT CHAIN RATIO, SERUM: 3.15 RATIO — HIGH (ref 0.26–1.65)
LAMBDA LC SER QL IFE: 1.72 MG/DL — SIGNIFICANT CHANGE UP (ref 0.57–2.63)
METHOD TYPE: SIGNIFICANT CHANGE UP
ORGANISM # SPEC MICROSCOPIC CNT: SIGNIFICANT CHANGE UP
SPECIMEN SOURCE: SIGNIFICANT CHANGE UP
SPECIMEN SOURCE: SIGNIFICANT CHANGE UP
T-CELL CD4 SUBSET PNL BLD: 456 /UL — SIGNIFICANT CHANGE UP (ref 325–1251)

## 2020-03-30 PROCEDURE — 80053 COMPREHEN METABOLIC PANEL: CPT

## 2020-03-30 PROCEDURE — 87186 SC STD MICRODIL/AGAR DIL: CPT

## 2020-03-30 PROCEDURE — 83735 ASSAY OF MAGNESIUM: CPT

## 2020-03-30 PROCEDURE — 87086 URINE CULTURE/COLONY COUNT: CPT

## 2020-03-30 PROCEDURE — 83615 LACTATE (LD) (LDH) ENZYME: CPT

## 2020-03-30 PROCEDURE — 84478 ASSAY OF TRIGLYCERIDES: CPT

## 2020-03-30 PROCEDURE — 86355 B CELLS TOTAL COUNT: CPT

## 2020-03-30 PROCEDURE — 86357 NK CELLS TOTAL COUNT: CPT

## 2020-03-30 PROCEDURE — 93005 ELECTROCARDIOGRAM TRACING: CPT

## 2020-03-30 PROCEDURE — 36415 COLL VENOUS BLD VENIPUNCTURE: CPT

## 2020-03-30 PROCEDURE — 85027 COMPLETE CBC AUTOMATED: CPT

## 2020-03-30 PROCEDURE — 86140 C-REACTIVE PROTEIN: CPT

## 2020-03-30 PROCEDURE — 85610 PROTHROMBIN TIME: CPT

## 2020-03-30 PROCEDURE — 85730 THROMBOPLASTIN TIME PARTIAL: CPT

## 2020-03-30 PROCEDURE — 87631 RESP VIRUS 3-5 TARGETS: CPT

## 2020-03-30 PROCEDURE — 83036 HEMOGLOBIN GLYCOSYLATED A1C: CPT

## 2020-03-30 PROCEDURE — 84100 ASSAY OF PHOSPHORUS: CPT

## 2020-03-30 PROCEDURE — 83880 ASSAY OF NATRIURETIC PEPTIDE: CPT

## 2020-03-30 PROCEDURE — 82784 ASSAY IGA/IGD/IGG/IGM EACH: CPT

## 2020-03-30 PROCEDURE — 85362 FIBRIN DEGRADATION PRODUCTS: CPT

## 2020-03-30 PROCEDURE — 83930 ASSAY OF BLOOD OSMOLALITY: CPT

## 2020-03-30 PROCEDURE — 82728 ASSAY OF FERRITIN: CPT

## 2020-03-30 PROCEDURE — 84484 ASSAY OF TROPONIN QUANT: CPT

## 2020-03-30 PROCEDURE — 87635 SARS-COV-2 COVID-19 AMP PRB: CPT

## 2020-03-30 PROCEDURE — 83520 IMMUNOASSAY QUANT NOS NONAB: CPT

## 2020-03-30 PROCEDURE — 99285 EMERGENCY DEPT VISIT HI MDM: CPT | Mod: 25

## 2020-03-30 PROCEDURE — 71045 X-RAY EXAM CHEST 1 VIEW: CPT

## 2020-03-30 PROCEDURE — 85384 FIBRINOGEN ACTIVITY: CPT

## 2020-03-30 PROCEDURE — 85379 FIBRIN DEGRADATION QUANT: CPT

## 2020-03-30 PROCEDURE — 83605 ASSAY OF LACTIC ACID: CPT

## 2020-03-30 PROCEDURE — 81001 URINALYSIS AUTO W/SCOPE: CPT

## 2020-03-30 PROCEDURE — 87150 DNA/RNA AMPLIFIED PROBE: CPT

## 2020-03-30 PROCEDURE — 82962 GLUCOSE BLOOD TEST: CPT

## 2020-03-30 PROCEDURE — 87040 BLOOD CULTURE FOR BACTERIA: CPT

## 2020-04-01 LAB
CULTURE RESULTS: SIGNIFICANT CHANGE UP
SPECIMEN SOURCE: SIGNIFICANT CHANGE UP

## 2020-04-02 LAB
A-TUMOR NECROSIS FACT SERPL-MCNC: <5 PG/ML — SIGNIFICANT CHANGE UP
IL10 SERPL-MCNC: 15 PG/ML — SIGNIFICANT CHANGE UP
IL12 SERPL-MCNC: <5 PG/ML — SIGNIFICANT CHANGE UP
IL13 SERPL-MCNC: <5 PG/ML — SIGNIFICANT CHANGE UP
IL2 SERPL-MCNC: 4610 PG/ML — HIGH
IL2 SERPL-MCNC: <5 PG/ML — SIGNIFICANT CHANGE UP
IL4 SERPL-MCNC: <5 PG/ML — SIGNIFICANT CHANGE UP
IL6 SERPL-MCNC: 18 PG/ML — HIGH
IL8 SERPL-MCNC: <5 PG/ML — SIGNIFICANT CHANGE UP
INTERFERON GAMMA: <5 PG/ML — SIGNIFICANT CHANGE UP
INTERLEUKIN 1 BETA: <5 PG/ML — SIGNIFICANT CHANGE UP
INTERLEUKIN 17: <5 PG/ML — SIGNIFICANT CHANGE UP
INTERLEUKIN 5: <5 PG/ML — SIGNIFICANT CHANGE UP

## 2022-01-31 NOTE — CONSULT NOTE ADULT - CONSTITUTIONAL DETAILS
Left message for patient to call the office to schedule a follow up  Patients last visit with SHAUNA was 08/2021 
Patient has not had medication refilled since mid-October  Will speak to patient about her medication and compliance and re-evaluate refill at appointment on 2/4/22 
Patient returned call appointment scheduled for 01/04/2022 
Spoke with the patient she is aware the medication will not be filled at this time  SHAUNA will re-evalute on her scheduled appointment 02/04/2022 
Thanks  I am not going to renew her med until I see her as it looks like she would have run out in mid-November 
well-groomed/no distress/well-developed

## 2022-03-30 NOTE — ED ADULT NURSE NOTE - CAS TRG GENERAL AIRWAY, MLM
PLEASE READ YOUR DISCHARGE INSTRUCTIONS ENTIRELY AS IT CONTAINS IMPORTANT INFORMATION.  - OTC Tylenol/anti-inflammatory as needed for pain. TAKE 400-600MG EVERY 8 HOUR WITH FOOD FOR PAIN. DO NOT MAX OUT 4000MG MAX 24HR/DAY.  - continue ice compression, rice therapy, and muscle stretches.     - You need to evaluated by Occupational medicine to determine your return to work status.       - if no improvement or worsening symptoms, recommend follow-up with *Occupational medicine for further evaluation and for work related injury.  Please call the number below to set up appointment; a referral has been placed. Or you can choose another location on form given.     [facility]  [address] [place] [address] [address], [place] [address] OR [facility]  Please call [phone]    -You must understand that you've received an Urgent Care treatment only and that you may be released before all your medical problems are known or treated. You, the patient, will arrange for follow up care.    - If your condition worsens or fails to improve we recommend that you receive another evaluation at the emergency room immediately. Strict ER versus clinic precautions given.      RED FLAGS/WARNING SYMPTOMS DISCUSSED WITH PATIENT THAT WOULD WARRANT EMERGENT MEDICAL ATTENTION. Patient aware and verbalized understanding.     Patent

## 2022-08-22 NOTE — ED ADULT NURSE NOTE - PMH
[FreeTextEntry1] : Patient feels better, she does not feel nervous or anxious now. No tremor of the hands. She denies palpitations, heat intolerance or increased perspiration. No neck pain or difficulty swallowing. Her weight has decreased. .\par  Arthritis    History of hypertension    S/P cardiac catheterization  stent x 1

## 2022-11-27 NOTE — ED ADULT TRIAGE NOTE - LANGUAGE ASSISTANCE NEEDED
7/9/2020  I, Mulugeta Sen MD, saw and evaluated the patient  I have discussed the patient with the resident/non-physician practitioner and agree with the resident's/non-physician practitioner's findings, Plan of Care, and MDM as documented in the resident's/non-physician practitioner's note, except where noted  All available labs and Radiology studies were reviewed  I was present for key portions of any procedure(s) performed by the resident/non-physician practitioner and I was immediately available to provide assistance  At this point I agree with the current assessment done in the Emergency Department  I have conducted an independent evaluation of this patient a history and physical is as follows:  Pt was doing yard work 3 days ago then started with rash that day  Pt rash is localized to bilateral forearms no sob or throat closing   PE: linear rash on forarms and face heart reg lungs clear MDM: will treat with steroids  ED Course         Critical Care Time  Procedures
No-Patient/Caregiver offered and refused free interpretation services.
gen weakness/dependent in mobility./unable to perform

## 2024-04-17 NOTE — DIETITIAN INITIAL EVALUATION ADULT. - EST PROTEIN NEEDS8
aspirin 81 mg oral tablet, chewable: 1 tab(s) orally once a day  atorvastatin 80 mg oral tablet: 1 tab(s) orally once a day (at bedtime)  cholecalciferol 100 mcg (4000 intl units) oral tablet: 1 tab(s) orally once a day  ferrous sulfate 324 mg (65 mg elemental iron) oral tablet: 1 tab(s) orally 2 times a day  Lantus 100 units/mL subcutaneous solution: 33 unit(s) subcutaneous once a day (at bedtime)  levothyroxine 75 mcg (0.075 mg) oral tablet: 1 tab(s) orally once a day  metoprolol succinate 100 mg oral tablet, extended release: 1 tab(s) orally once a day  Multiple Vitamins oral tablet: 1 tab(s) orally once a day  NovoLOG FlexPen 100 units/mL injectable solution: injectable 3 times a day per sliding scale regimen. MAX dose 40 units  potassium chloride 10 mEq oral tablet, extended release: 2 tab(s) orally once a day  torsemide 20 mg oral tablet: 1 tab(s) orally 2 times a day   69.72 Admelog 100 units/mL injectable solution: 2 unit(s) injectable 3 times a day (before meals)  ammonium lactate 12% topical lotion: Apply topically to affected area once a day 1 Apply topically to affected area  apixaban 5 mg oral tablet: 1 tab(s) orally 2 times a day  aspirin 81 mg oral tablet, chewable: 1 tab(s) orally once a day  atorvastatin 80 mg oral tablet: 1 tab(s) orally once a day (at bedtime)  calamine topical lotion: 1 Apply topically to affected area 3 times a day  Dextrose 10% in Water intravenous solution: 125 milliliter(s) intravenous once  droxidopa 100 mg oral capsule: 6 cap(s) orally 3 times a day  ferrous sulfate 325 mg (65 mg elemental iron) oral tablet: 1 tab(s) orally 2 times a day  Gluco-To-Go 40% oral gel: 1 application orally every 2 to 5 minutes as needed for hypoglycemia  glucose 50% intravenous solution: 50 milliliter(s) intravenous once  glucose 50% intravenous solution: 25 milliliter(s) intravenous once  insulin glargine 100 units/mL subcutaneous solution: 6 unit(s) subcutaneous once a day (at bedtime)  levothyroxine 75 mcg (0.075 mg) oral tablet: 1 tab(s) orally once a day  loperamide 2 mg oral capsule: 1 cap(s) orally 3 times a day As needed loose stools  midodrine 10 mg oral tablet: 3 tab(s) orally  midodrine 10 mg oral tablet: 2 tab(s) orally once a day before dinner  Multiple Vitamins oral tablet: 1 tab(s) orally once a day  pantoprazole 40 mg oral delayed release tablet: 1 tab(s) orally every 24 hours  psyllium 3.4 g/7 g oral powder for reconstitution: 1 application orally 3 times a day  pyRIDostigmine 60 mg oral tablet: 1 tab(s) orally 3 times a day  triamcinolone 0.1% topical ointment: 1 Apply topically to affected area 2 times a day   Admelog 100 units/mL injectable solution: 2 unit(s) injectable 3 times a day (before meals)  ammonium lactate 12% topical lotion: Apply topically to affected area once a day as needed for  dry skin 1 Apply topically to affected area  apixaban 5 mg oral tablet: 1 tab(s) orally 2 times a day  aspirin 81 mg oral tablet, chewable: 1 tab(s) orally once a day  atorvastatin 80 mg oral tablet: 1 tab(s) orally once a day (at bedtime)  calamine topical lotion: Apply topically to affected area 3 times a day as needed for  itching 1 Apply topically to affected area 3 times a day  droxidopa 100 mg oral capsule: 6 cap(s) orally 3 times a day  ferrous sulfate 325 mg (65 mg elemental iron) oral tablet: 1 tab(s) orally 2 times a day  Gluco-To-Go 40% oral gel: 1 application orally every 2 to 5 minutes as needed for hypoglycemia  insulin glargine 100 units/mL subcutaneous solution: 6 unit(s) subcutaneous once a day (at bedtime)  levothyroxine 75 mcg (0.075 mg) oral tablet: 1 tab(s) orally once a day  loperamide 2 mg oral capsule: 1 cap(s) orally 3 times a day As needed loose stools  midodrine 10 mg oral tablet: 2 tab(s) orally once a day before dinner Hold if SBP &gt; 150  Multiple Vitamins oral tablet: 1 tab(s) orally once a day  pantoprazole 40 mg oral delayed release tablet: 1 tab(s) orally every 24 hours  psyllium 3.4 g/7 g oral powder for reconstitution: 1 application orally 3 times a day  pyRIDostigmine 60 mg oral tablet: 1 tab(s) orally 3 times a day

## 2024-06-28 NOTE — ED PROVIDER NOTE - CROS ED RESP ALL NEG
No care due was identified.  [facility] Embedded Care Due Messages. Reference number: [id number].   6/28/2024 10:54:28 AM CDT   - - -